# Patient Record
Sex: MALE | Race: BLACK OR AFRICAN AMERICAN | Employment: FULL TIME
[De-identification: names, ages, dates, MRNs, and addresses within clinical notes are randomized per-mention and may not be internally consistent; named-entity substitution may affect disease eponyms.]

---

## 2017-01-04 ENCOUNTER — TELEPHONE (OUTPATIENT)
Dept: ORTHOPEDIC SURGERY | Facility: CLINIC | Age: 47
End: 2017-01-04

## 2017-01-05 ENCOUNTER — OFFICE VISIT (OUTPATIENT)
Dept: INTERNAL MEDICINE | Facility: CLINIC | Age: 47
End: 2017-01-05

## 2017-01-05 VITALS
DIASTOLIC BLOOD PRESSURE: 74 MMHG | HEART RATE: 99 BPM | BODY MASS INDEX: 31.06 KG/M2 | WEIGHT: 242 LBS | HEIGHT: 74 IN | SYSTOLIC BLOOD PRESSURE: 122 MMHG

## 2017-01-05 DIAGNOSIS — E66.9 OBESITY (BMI 30.0-34.9): ICD-10-CM

## 2017-01-05 DIAGNOSIS — I10 ESSENTIAL HYPERTENSION: Primary | ICD-10-CM

## 2017-01-05 DIAGNOSIS — R73.03 PREDIABETES: ICD-10-CM

## 2017-01-05 DIAGNOSIS — M25.562 CHRONIC PAIN OF LEFT KNEE: ICD-10-CM

## 2017-01-05 DIAGNOSIS — G89.29 CHRONIC PAIN OF LEFT KNEE: ICD-10-CM

## 2017-01-05 DIAGNOSIS — E78.2 MIXED HYPERLIPIDEMIA: ICD-10-CM

## 2017-01-05 PROCEDURE — 99213 OFFICE O/P EST LOW 20 MIN: CPT | Performed by: INTERNAL MEDICINE

## 2017-01-05 RX ORDER — LISINOPRIL 10 MG/1
10 TABLET ORAL DAILY
Qty: 30 TABLET | Refills: 11 | Status: SHIPPED | OUTPATIENT
Start: 2017-01-05 | End: 2017-10-02 | Stop reason: SDUPTHER

## 2017-01-05 ASSESSMENT — ENCOUNTER SYMPTOMS
SHORTNESS OF BREATH: 0
NAUSEA: 0
BLURRED VISION: 0
CONSTIPATION: 0
COUGH: 0
BACK PAIN: 1
EYE REDNESS: 0
ABDOMINAL PAIN: 0

## 2017-01-16 ENCOUNTER — TELEPHONE (OUTPATIENT)
Dept: ORTHOPEDIC SURGERY | Facility: CLINIC | Age: 47
End: 2017-01-16

## 2017-02-21 DIAGNOSIS — I10 ESSENTIAL HYPERTENSION: ICD-10-CM

## 2017-02-21 RX ORDER — LISINOPRIL 5 MG/1
TABLET ORAL
Qty: 30 TABLET | Refills: 2 | Status: SHIPPED | OUTPATIENT
Start: 2017-02-21 | End: 2017-05-23 | Stop reason: SDUPTHER

## 2017-04-17 DIAGNOSIS — M25.562 LEFT KNEE PAIN, UNSPECIFIED CHRONICITY: Primary | ICD-10-CM

## 2017-04-19 ENCOUNTER — OFFICE VISIT (OUTPATIENT)
Dept: ORTHOPEDIC SURGERY | Age: 47
End: 2017-04-19
Payer: COMMERCIAL

## 2017-04-19 VITALS — HEIGHT: 74 IN | WEIGHT: 240 LBS | BODY MASS INDEX: 30.8 KG/M2

## 2017-04-19 DIAGNOSIS — M17.12 PRIMARY OSTEOARTHRITIS OF LEFT KNEE: Primary | ICD-10-CM

## 2017-04-19 PROCEDURE — 99213 OFFICE O/P EST LOW 20 MIN: CPT | Performed by: STUDENT IN AN ORGANIZED HEALTH CARE EDUCATION/TRAINING PROGRAM

## 2017-04-19 ASSESSMENT — ENCOUNTER SYMPTOMS
CHOKING: 0
APNEA: 0
DIARRHEA: 0
VOMITING: 0

## 2017-05-23 DIAGNOSIS — I10 ESSENTIAL HYPERTENSION: ICD-10-CM

## 2017-05-23 RX ORDER — LISINOPRIL 5 MG/1
TABLET ORAL
Qty: 30 TABLET | Refills: 2 | Status: SHIPPED | OUTPATIENT
Start: 2017-05-23 | End: 2017-08-25 | Stop reason: SDUPTHER

## 2017-07-23 DIAGNOSIS — G89.29 CHRONIC LOW BACK PAIN: ICD-10-CM

## 2017-07-23 DIAGNOSIS — E78.5 HYPERLIPIDEMIA: ICD-10-CM

## 2017-07-23 DIAGNOSIS — I10 HYPERTENSION: ICD-10-CM

## 2017-07-23 DIAGNOSIS — R73.03 PREDIABETES: ICD-10-CM

## 2017-07-23 DIAGNOSIS — M54.50 CHRONIC LOW BACK PAIN: ICD-10-CM

## 2017-07-24 RX ORDER — ACETAMINOPHEN/DIPHENHYDRAMINE 500MG-25MG
TABLET ORAL
Qty: 30 TABLET | Refills: 11 | Status: SHIPPED | OUTPATIENT
Start: 2017-07-24 | End: 2018-08-26 | Stop reason: SDUPTHER

## 2017-07-24 RX ORDER — ATORVASTATIN CALCIUM 20 MG/1
TABLET, FILM COATED ORAL
Qty: 30 TABLET | Refills: 11 | Status: SHIPPED | OUTPATIENT
Start: 2017-07-24 | End: 2018-04-09 | Stop reason: SDUPTHER

## 2017-07-24 RX ORDER — MELOXICAM 15 MG/1
TABLET ORAL
Qty: 30 TABLET | Refills: 11 | Status: SHIPPED | OUTPATIENT
Start: 2017-07-24 | End: 2017-10-02 | Stop reason: ALTCHOICE

## 2017-08-25 DIAGNOSIS — I10 ESSENTIAL HYPERTENSION: ICD-10-CM

## 2017-08-25 RX ORDER — LISINOPRIL 5 MG/1
TABLET ORAL
Qty: 30 TABLET | Refills: 2 | Status: SHIPPED | OUTPATIENT
Start: 2017-08-25 | End: 2017-12-02 | Stop reason: SDUPTHER

## 2017-09-22 ENCOUNTER — HOSPITAL ENCOUNTER (EMERGENCY)
Age: 47
Discharge: LEFT BEFORE DISCHARGE | End: 2017-09-22
Attending: EMERGENCY MEDICINE
Payer: COMMERCIAL

## 2017-09-22 ENCOUNTER — APPOINTMENT (OUTPATIENT)
Dept: GENERAL RADIOLOGY | Age: 47
End: 2017-09-22
Payer: COMMERCIAL

## 2017-09-22 VITALS
HEIGHT: 74 IN | OXYGEN SATURATION: 98 % | DIASTOLIC BLOOD PRESSURE: 70 MMHG | SYSTOLIC BLOOD PRESSURE: 134 MMHG | BODY MASS INDEX: 28.86 KG/M2 | TEMPERATURE: 98.6 F | WEIGHT: 224.87 LBS | HEART RATE: 62 BPM | RESPIRATION RATE: 12 BRPM

## 2017-09-22 DIAGNOSIS — M79.672 PAIN IN BOTH FEET: ICD-10-CM

## 2017-09-22 DIAGNOSIS — Z53.20 LEFT BEFORE TREATMENT COMPLETED: Primary | ICD-10-CM

## 2017-09-22 DIAGNOSIS — M79.671 PAIN IN BOTH FEET: ICD-10-CM

## 2017-09-22 LAB
CHP ED QC CHECK: NORMAL
GLUCOSE BLD-MCNC: 90 MG/DL
GLUCOSE BLD-MCNC: 90 MG/DL (ref 75–110)

## 2017-09-22 PROCEDURE — 82947 ASSAY GLUCOSE BLOOD QUANT: CPT

## 2017-09-22 PROCEDURE — 72100 X-RAY EXAM L-S SPINE 2/3 VWS: CPT

## 2017-09-22 PROCEDURE — 99283 EMERGENCY DEPT VISIT LOW MDM: CPT

## 2017-09-22 ASSESSMENT — ENCOUNTER SYMPTOMS
COLOR CHANGE: 0
BACK PAIN: 1

## 2017-09-22 ASSESSMENT — PAIN SCALES - GENERAL: PAINLEVEL_OUTOF10: 10

## 2017-09-22 ASSESSMENT — PAIN DESCRIPTION - FREQUENCY: FREQUENCY: CONTINUOUS

## 2017-09-22 ASSESSMENT — PAIN DESCRIPTION - DESCRIPTORS: DESCRIPTORS: BURNING;SHARP

## 2017-09-22 ASSESSMENT — PAIN DESCRIPTION - ORIENTATION: ORIENTATION: RIGHT;LEFT

## 2017-10-02 ENCOUNTER — OFFICE VISIT (OUTPATIENT)
Dept: INTERNAL MEDICINE | Age: 47
End: 2017-10-02
Payer: COMMERCIAL

## 2017-10-02 VITALS
BODY MASS INDEX: 28.36 KG/M2 | WEIGHT: 221 LBS | DIASTOLIC BLOOD PRESSURE: 87 MMHG | SYSTOLIC BLOOD PRESSURE: 135 MMHG | HEIGHT: 74 IN | HEART RATE: 53 BPM

## 2017-10-02 DIAGNOSIS — G89.29 CHRONIC BILATERAL LOW BACK PAIN WITHOUT SCIATICA: ICD-10-CM

## 2017-10-02 DIAGNOSIS — L84 CALLUS OF FOOT: ICD-10-CM

## 2017-10-02 DIAGNOSIS — R73.02 IGT (IMPAIRED GLUCOSE TOLERANCE): ICD-10-CM

## 2017-10-02 DIAGNOSIS — I10 ESSENTIAL HYPERTENSION: Primary | ICD-10-CM

## 2017-10-02 DIAGNOSIS — M17.12 PRIMARY OSTEOARTHRITIS OF LEFT KNEE: ICD-10-CM

## 2017-10-02 DIAGNOSIS — F17.200 SMOKER: ICD-10-CM

## 2017-10-02 DIAGNOSIS — M54.50 CHRONIC BILATERAL LOW BACK PAIN WITHOUT SCIATICA: ICD-10-CM

## 2017-10-02 DIAGNOSIS — E78.2 MIXED HYPERLIPIDEMIA: ICD-10-CM

## 2017-10-02 LAB — HBA1C MFR BLD: 5.5 %

## 2017-10-02 PROCEDURE — 83036 HEMOGLOBIN GLYCOSYLATED A1C: CPT | Performed by: INTERNAL MEDICINE

## 2017-10-02 PROCEDURE — 99213 OFFICE O/P EST LOW 20 MIN: CPT | Performed by: INTERNAL MEDICINE

## 2017-10-02 ASSESSMENT — PATIENT HEALTH QUESTIONNAIRE - PHQ9
8. MOVING OR SPEAKING SO SLOWLY THAT OTHER PEOPLE COULD HAVE NOTICED. OR THE OPPOSITE, BEING SO FIGETY OR RESTLESS THAT YOU HAVE BEEN MOVING AROUND A LOT MORE THAN USUAL: 0
4. FEELING TIRED OR HAVING LITTLE ENERGY: 0
10. IF YOU CHECKED OFF ANY PROBLEMS, HOW DIFFICULT HAVE THESE PROBLEMS MADE IT FOR YOU TO DO YOUR WORK, TAKE CARE OF THINGS AT HOME, OR GET ALONG WITH OTHER PEOPLE: 1
2. FEELING DOWN, DEPRESSED OR HOPELESS: 2
3. TROUBLE FALLING OR STAYING ASLEEP: 0
7. TROUBLE CONCENTRATING ON THINGS, SUCH AS READING THE NEWSPAPER OR WATCHING TELEVISION: 1
5. POOR APPETITE OR OVEREATING: 1
1. LITTLE INTEREST OR PLEASURE IN DOING THINGS: 0
6. FEELING BAD ABOUT YOURSELF - OR THAT YOU ARE A FAILURE OR HAVE LET YOURSELF OR YOUR FAMILY DOWN: 2
9. THOUGHTS THAT YOU WOULD BE BETTER OFF DEAD, OR OF HURTING YOURSELF: 1
SUM OF ALL RESPONSES TO PHQ9 QUESTIONS 1 & 2: 2
SUM OF ALL RESPONSES TO PHQ QUESTIONS 1-9: 7

## 2017-10-02 ASSESSMENT — ENCOUNTER SYMPTOMS: BACK PAIN: 1

## 2017-10-02 NOTE — PROGRESS NOTES
clinically.        Minor degenerative changes centered at L4-L5 disc space level without    central canal narrowing with evidence of neural foraminal narrowing         Knee left  Impression   : Moderate medial compartment osteoarthritis that is passively correctable on stress view.       I have reviewed the above x-rays and agree with this resident-generated radiology report.             Past Medical History:   Diagnosis Date    Asthma     Chronic back pain     Headache     HLD (hyperlipidemia) 11/6/2013    Hypertension 7/10/2014    Obesity 10/10/2013    Substance abuse     Type II or unspecified type diabetes mellitus without mention of complication, not stated as uncontrolled        No past surgical history on file. ALLERGIES    No Known Allergies    MEDICATIONS:      Current Outpatient Prescriptions on File Prior to Visit   Medication Sig Dispense Refill    lisinopril (PRINIVIL;ZESTRIL) 5 MG tablet take 1 tablet by mouth once daily 30 tablet 2    RA ASPIRIN EC 81 MG EC tablet take 1 tablet by mouth once daily 30 tablet 11    atorvastatin (LIPITOR) 20 MG tablet take 1 tablet by mouth once daily 30 tablet 11    ibuprofen (ADVIL;MOTRIN) 800 MG tablet Take 1 tablet by mouth every 8 hours as needed for Pain 120 tablet 2    VENTOLIN  (90 BASE) MCG/ACT inhaler INHALE 2 PUFFS EVERY SIX HOURS AS NEEDED FOR WHEEZING 18 g 11     No current facility-administered medications on file prior to visit. SOCIAL HISTORY    Reviewed and no change from previous record. Sekou Song  reports that he has been smoking Cigars. He has smoked for the past 10.00 years.  He has never used smokeless tobacco.    FAMILY HISTORY:    Reviewed and No change from previous visit    HEALTH MAINTENANCE DUE:      Health Maintenance Due   Topic Date Due    Flu vaccine (1) 09/01/2017       REVIEW OF SYSTEMS:    12 point review of symptoms completed and found to be normal except noted in the HPI    Review of Systems Constitutional: Negative for fever, malaise/fatigue and weight loss. HENT: Negative for congestion, hearing loss and sore throat. Eyes: Negative for blurred vision and redness. Respiratory: Negative for cough, sputum production and shortness of breath. Cardiovascular: Negative for chest pain, palpitations and leg swelling. Gastrointestinal: Negative for abdominal pain, blood in stool, constipation and nausea. Musculoskeletal: Positive for back pain and joint pain. Negative for falls and myalgias. Skin: Negative for itching and rash. Neurological: Negative for dizziness, sensory change, focal weakness and loss of consciousness. Endo/Heme/Allergies: Negative for polydipsia. Does not bruise/bleed easily. Psychiatric/Behavioral: Positive for depression. Negative for hallucinations, substance abuse and suicidal ideas. The patient is not nervous/anxious. PHYSICAL EXAM:      Vitals:    10/02/17 0909   BP: (!) 138/90   Site: Left Arm   Position: Sitting   Cuff Size: Large Adult   Pulse: 58   Weight: 221 lb (100.2 kg)   Height: 6' 2\" (1.88 m)     Body mass index is 28.37 kg/(m^2). BP Readings from Last 3 Encounters:   10/02/17 (!) 138/90   09/22/17 134/70   01/05/17 122/74        Wt Readings from Last 3 Encounters:   10/02/17 221 lb (100.2 kg)   09/22/17 224 lb 13.9 oz (102 kg)   04/19/17 240 lb (108.9 kg)       Physical Exam      HENT:  Normocephalic, Atraumatic, Bilateral external ears normal, Oropharynx moist, No oral exudates, Nose normal. Neck- Normal range of motion, No tenderness, Supple, No stridor. Eyes:  PERRL, EOMI, Conjunctiva normal, No discharge. Respiratory:  Normal breath sounds, No respiratory distress, No wheezing, No chest tenderness. Cardiovascular:  Normal heart rate, Normal rhythm, No murmurs, No rubs, No gallops. GI:  Bowel sounds normal, Soft, No tenderness  Musculoskeletal:  Intact distal pulses, No edema, No tenderness, Back- No tenderness. Decreased ROM. B/l foot calluses. Integument:  Warm, Dry, No erythema, No rash. Lymphatic:  No lymphadenopathy noted. Neurologic:  Alert & oriented x 3, Normal motor function, Normal sensory function, No focal deficits noted. Psychiatric:  Affect normal    LABORATORY FINDINGS:    CBC:  Lab Results   Component Value Date    WBC 4.4 07/02/2015    HGB 14.5 07/02/2015     07/02/2015     BMP:    Lab Results   Component Value Date     01/05/2017    K 3.8 01/05/2017     01/05/2017    CO2 27 01/05/2017    BUN 13 01/05/2017    CREATININE 1.04 01/05/2017    GLUCOSE 90 09/22/2017     HEMOGLOBIN A1C:   Lab Results   Component Value Date    LABA1C 5.5 10/02/2017     MICROALBUMIN URINE:   Lab Results   Component Value Date    MICROALBUR <12 04/08/2016     FASTING LIPID PANEL:  Lab Results   Component Value Date    CHOL 111 01/05/2017    HDL 31 (L) 01/05/2017    TRIG 70 01/05/2017     Lab Results   Component Value Date    LDLCHOLESTEROL 66 01/05/2017       LIVER PROFILE:  Lab Results   Component Value Date    ALT 40 07/02/2015    AST 20 07/02/2015    PROT 7.0 07/02/2015    BILITOT 0.74 07/02/2015    BILIDIR <0.08 07/02/2015    LABALBU 4.5 07/02/2015      THYROID FUNCTION:   Lab Results   Component Value Date    TSH 1.16 07/02/2015      URINE ANALYSIS: No results found for: LABURIN  ASSESSMENT AND PLAN:    1. Essential hypertension  Continue Lisinopril      2. Callus of foot    - [address]    3. Mixed hyperlipidemia  statin    4. Primary osteoarthritis of left knee  Follow up with orthopedics    - [facility]    5. Chronic bilateral low back pain without sciatica  Back stretches    - [facility]    6. IGT (impaired glucose tolerance)    - POCT glycosylated hemoglobin (Hb A1C)    7. Smoker            FOLLOW UP AND INSTRUCTIONS:   Return in about 6 months (around 4/2/2018).     1Judy Enio Alarcon received counseling on the following healthy behaviors: nutrition, exercise and medication adherence    2. Reviewed prior labs and health maintenance. 3. Discussed use, benefit, and side effects of prescribed medications. Barriers to medication compliance addressed. All patient questions answered. Pt voiced understanding.       Vinay Magana  Attending Physician, [address] [place] [address], [facility] Medicine [facility] Program  [address] [place] [address]  10/2/2017, 10:00 AM

## 2017-10-02 NOTE — MR AVS SNAPSHOT
After Visit Summary             Mary Cartwright   10/2/2017 9:30 AM   Office Visit    Description:  Male : 1970   Provider:  Mitch Salas MD   Department:  [facility] and Future Appointments         Below is a list of your follow-up and future appointments. This may not be a complete list as you may have made appointments directly with providers that we are not aware of or your providers may have made some for you. Please call your providers to confirm appointments. It is important to keep your appointments. Please bring your current insurance card, photo ID, co-pay, and all medication bottles to your appointment. If self-pay, payment is expected at the time of service. Your To-Do List     Future Appointments Provider Department Dept Phone    2017 12:45 PM Jacki Peter, [address] [place] [phone]    Patient must bring photo ID, insurance card, co-pay, and medication bottles to appointment Appointment must be kept or cancelled within 24 hours Patient should be prepared to provide PCP name and date of last PCP visit during their appointment    2018 9:00 AM ZAYNAB Leo [address] [phone]    Please arrive 15 minutes prior to appointment time, bring insurance card and photo ID. Follow-Up    Return in about 6 months (around 2018).          Information from Your Visit        Department     Name Address Phone Fax    Z[facility]NA[facility] [address] [phone]      You Were Seen for:         Comments    Essential hypertension   [[id number]]         Vital Signs     Blood Pressure Pulse Height Weight Body Mass Index Smoking Status    135/87 (Site: Left Arm, Position: Sitting, Cuff Size: Large Adult) 53 6' 2\" (1.88 m) 221 lb (100.2 kg) 28.37 kg/m2 Current Every Day Smoker Additional Information about your Body Mass Index (BMI)           Your BMI as listed above is considered overweight (25.0-29.9). BMI is an estimate of body fat, calculated from your height and weight. The higher your BMI, the greater your risk of heart disease, high blood pressure, type 2 diabetes, stroke, gallstones, arthritis, sleep apnea, and certain cancers. BMI is not perfect. It may overestimate body fat in athletes and people who are more muscular. If your body fat is high you can improve your BMI by decreasing your calorie intake and becoming more physically active. Learn more at: [employer]          Instructions    -Follow-up appointment scheduled for 4/9/18, AVS given to patient. It is very important that you keep your appointments, please contact us if you are unable to keep your scheduled appt     [facility] Your appt is 11/8/17 at 12:45 pm. Please arrive 15 mins early and bring ID and insurance information. Address and number given to pt. If you can't make your appt please call [phone]. If you no show to this appt you will have to find another office due to this office strict no show policy. Referral to [address] [place] [address] was placed, information given to patient, they will contact the facility to set up an appt. ---Krista                    Today's Medication Changes          These changes are accurate as of: 10/2/17 10:16 AM.  If you have any questions, ask your nurse or doctor.                STOP taking these medications           meloxicam 15 MG tablet   Commonly known as:  MOBIC   Stopped by:  Hunter Andino MD               Your Current Medications Are              lisinopril (PRINIVIL;ZESTRIL) 5 MG tablet take 1 tablet by mouth once daily    RA ASPIRIN EC 81 MG EC tablet take 1 tablet by mouth once daily    atorvastatin (LIPITOR) 20 MG tablet take 1 tablet by mouth once daily ibuprofen (ADVIL;MOTRIN) 800 MG tablet Take 1 tablet by mouth every 8 hours as needed for Pain    VENTOLIN  (90 BASE) MCG/ACT inhaler INHALE 2 PUFFS EVERY SIX HOURS AS NEEDED FOR WHEEZING      Allergies           No Known Allergies      We Ordered/Performed the following           [facility] [address]     Scheduling Instructions:    [facility]  [address]  [address]  [phone]    Comments: The patient can be scheduled with any member of the group, including the provider with the first available appointments. [facility]     Scheduling Instructions:    [facility]  [address] [place], [address]  [phone]    Comments: The patient can be scheduled with any member of the group, including the provider with the first available appointments.      POCT glycosylated hemoglobin (Hb A1C)          Result Summary for POCT glycosylated hemoglobin (Hb A1C)      Result Information       Status          Normal Final result (Collected: 10/2/2017  9:40 AM)           10/2/2017  9:40 AM      Component Results     Component Value Ref Range & Units Status    Hemoglobin A1C 5.5 % Final               Additional Information        Basic Information     Date Of Birth Sex Race Ethnicity Preferred Language    1970 Male Black Non-/Non  English      Problem List as of 10/2/2017  Date Reviewed: 10/4/2016                Left knee pain    Headache    Lumbago    Hyperlipidemia    Hypertension    Asthma    Back pain    Obesity      Immunizations as of 10/2/2017     Name Date    Tdap (Boostrix, Adacel) 10/4/2016      Preventive Care        Date Due    Yearly Flu Vaccine (1) 9/1/2017    Pneumococcal Vaccine - Pneumovax for adults aged 19-64 years with: chronic heart disease, chronic lung disease, diabetes mellitus, alcoholism, chronic liver disease, or cigarette smoking. (1 of 1 - PPSV23)

## 2017-10-08 ASSESSMENT — ENCOUNTER SYMPTOMS
SORE THROAT: 0
NAUSEA: 0
COUGH: 0
CONSTIPATION: 0
SPUTUM PRODUCTION: 0
SHORTNESS OF BREATH: 0
BLURRED VISION: 0
BLOOD IN STOOL: 0
ABDOMINAL PAIN: 0
EYE REDNESS: 0

## 2017-10-11 ENCOUNTER — HOSPITAL ENCOUNTER (OUTPATIENT)
Dept: PAIN MANAGEMENT | Age: 47
Discharge: HOME | End: 2017-10-11
Payer: COMMERCIAL

## 2017-10-11 VITALS
DIASTOLIC BLOOD PRESSURE: 70 MMHG | SYSTOLIC BLOOD PRESSURE: 140 MMHG | WEIGHT: 221 LBS | TEMPERATURE: 98.6 F | OXYGEN SATURATION: 99 % | HEIGHT: 74 IN | BODY MASS INDEX: 28.36 KG/M2 | HEART RATE: 53 BPM | RESPIRATION RATE: 18 BRPM

## 2017-10-11 DIAGNOSIS — M54.50 CHRONIC BILATERAL LOW BACK PAIN WITHOUT SCIATICA: Primary | ICD-10-CM

## 2017-10-11 DIAGNOSIS — G89.29 CHRONIC BILATERAL LOW BACK PAIN WITHOUT SCIATICA: Primary | ICD-10-CM

## 2017-10-11 PROCEDURE — 80307 DRUG TEST PRSMV CHEM ANLYZR: CPT

## 2017-10-11 PROCEDURE — 99244 OFF/OP CNSLTJ NEW/EST MOD 40: CPT | Performed by: PAIN MEDICINE

## 2017-10-11 PROCEDURE — 99204 OFFICE O/P NEW MOD 45 MIN: CPT

## 2017-10-11 RX ORDER — MELOXICAM 7.5 MG/1
7.5 TABLET ORAL DAILY
COMMUNITY
End: 2017-11-08

## 2017-10-11 ASSESSMENT — ACTIVITIES OF DAILY LIVING (ADL): EFFECT OF PAIN ON DAILY ACTIVITIES: UNABLE TO WALK SHORT DISTANCES WITHOUT HAVING PAIN

## 2017-10-11 ASSESSMENT — ENCOUNTER SYMPTOMS
PHOTOPHOBIA: 0
CONSTIPATION: 0
BLURRED VISION: 0
BACK PAIN: 1
HEARTBURN: 0
VOMITING: 0
NAUSEA: 0

## 2017-10-11 ASSESSMENT — PAIN DESCRIPTION - PROGRESSION: CLINICAL_PROGRESSION: GRADUALLY WORSENING

## 2017-10-11 ASSESSMENT — PAIN SCALES - GENERAL: PAINLEVEL_OUTOF10: 10

## 2017-10-11 ASSESSMENT — PAIN DESCRIPTION - LOCATION: LOCATION: BACK

## 2017-10-11 ASSESSMENT — PAIN DESCRIPTION - ONSET: ONSET: ON-GOING

## 2017-10-11 ASSESSMENT — PAIN DESCRIPTION - ORIENTATION: ORIENTATION: RIGHT;LEFT

## 2017-10-11 ASSESSMENT — PAIN DESCRIPTION - FREQUENCY: FREQUENCY: CONTINUOUS

## 2017-10-11 ASSESSMENT — PAIN DESCRIPTION - PAIN TYPE: TYPE: CHRONIC PAIN

## 2017-10-11 ASSESSMENT — PAIN DESCRIPTION - DESCRIPTORS: DESCRIPTORS: BURNING;SHARP;STABBING

## 2017-10-11 ASSESSMENT — PAIN DESCRIPTION - DIRECTION: RADIATING_TOWARDS: BILATERAL LEGS

## 2017-10-11 NOTE — PROGRESS NOTES
[facility] Pain Management  Patient Pain Assessment  Consultation - No att. providers found    Primary Care Physician: Mercedes Mar MD    Chief complaint: Low back pain. HISTORY OF PRESENT ILLNESS:  Gunjan Aviles is 56 y.o. male referred to the pain clinic in consultation for low back pain by Josefa Schwarz MD    Patient is a 46-year-old male with history of low back pain of about 2 years duration. Patient reports he fell off for 20 feet from [facility] and bruised his back about 2 years ago. At that time apparently he went to [facility] [place] ER as well as [address] [place] [address] Apparently had an MRI and x-rays done and was sent home. He developed some bruise and was seen by a physician and apparently there are some medications. Patient was also told he has some arthritis in his back. He was sent to physical therapy which helped some. Patient cannot relate any factors that helped his back pain goal is to \"get rid of the pain\" because he likes to work and he cannot work at this time. Patient admits to using [facility] on a routine basis      Back Pain   This is a chronic problem. The current episode started more than 1 year ago. The problem occurs constantly. The problem has been gradually worsening since onset. The pain is present in the lumbar spine and gluteal. The quality of the pain is described as burning (sharp). The pain radiates to the left knee and right knee. The pain is at a severity of 10/10 (8-10). The pain is severe. The pain is the same all the time. The symptoms are aggravated by bending and lying down (lifting). Stiffness is present all day. Associated symptoms include headaches and weakness. Pertinent negatives include no chest pain, dysuria, fever or weight loss. (Both legs) Risk factors: smoking.        OARRS compliant? not applicable  Concern for prescription abuse?not applicable    Current Pain Assessment  Pain Assessment  Pain Assessment: 0-10  Pain Level: 10  Pain Type: Chronic pain  Pain Location: Back  Pain Orientation: Right, Left  Pain Radiating Towards: bilateral legs  Pain Descriptors: Burning, Sharp, Stabbing  Pain Frequency: Continuous  Pain Onset: On-going  Clinical Progression: Gradually worsening  Effect of Pain on Daily Activities: unable to walk short distances without having pain  Patient's Stated Pain Goal: 2 (decrease pain an increase activity)  Pain Intervention(s):  (smoking marijuana)                    ADVERSE MEDICATION EFFECTS:   Constipation: no  Bowel Regimen: No  Diet: common adult  Appetite:  ok  Sedation:  no  Urinary Retention: no    FOCUSED PAIN SCALE:  Highest : 10  Lowest :7  Average: Range-varies with activity  When and What  was your last procedure: none     Was your procedure effective:  na    ACTIVITY/SOCIAL/EMOTIONAL:  Sleep Pattern: 5 hours per night. generally restful sleep  Energy Level:  Tired/Fatigued  Currently attending Physical Therapy:  Last year   Home Exercises: never none  Mobility: states can only walk short distances  Currently seeing a Psychiatrist or Psychologist:  No, not sure when last saw.  Was  Only seen to be evaluated for disability  Emotional Issues: anxiety/ nervousness   Mood: depressed, denies any suicidal ideations    ABERRANT BEHAVIORS SINCE LAST VISIT:  Have you ever been treated in another Pain Clinic yes, [facility] in 2014  Refills for prescriptions appropriate: no  Lost rx/pills: not applicable  Taking more medication than prescribed:  not applicable  Are you receiving PAIN medications from  other doctors: not applicable  Last Urine/Serum Drug Screen :  Will collect today  Was Serum/UDS as anticipated?  not applicable  Brought pill bottles in :not applicable   Was Pill count appropriate? :not applicable   Are currently pregnant? not applicable  Recent ER visits: Yes, in September for foot pain             Past Medical History      Diagnosis Date    Asthma     Chronic back pain     Headache     HLD (hyperlipidemia) 11/6/2013 photophobia. Respiratory:        Asthma, Bronchitis   Cardiovascular: Negative for chest pain and palpitations. Gastrointestinal: Negative for constipation, heartburn, nausea and vomiting. Genitourinary: Negative for dysuria and hematuria. Musculoskeletal: Positive for back pain. Aliya Jordan off a balcony, approx 20 feet. Was evaluated in ED and discharged   Skin: Negative. Neurological: Positive for weakness and headaches. Endo/Heme/Allergies: Negative. Psychiatric/Behavioral: Positive for depression and substance abuse. Negative for suicidal ideas. The patient is nervous/anxious. GENERAL PHYSICAL EXAM:  Vitals: BP (!) 140/70   Pulse 53   Temp 98.6 °F (37 °C) (Oral)   Resp 18   Ht 6' 2\" (1.88 m)   Wt 221 lb (100.2 kg)   SpO2 99%   BMI 28.37 kg/m² , Body mass index is 28.37 kg/m². Physical Exam   Constitutional: He is oriented to person, place, and time. He appears well-developed and well-nourished. HENT:   Head: Normocephalic and atraumatic. Eyes: Conjunctivae and EOM are normal. Pupils are equal, round, and reactive to light. Neck: Normal range of motion. Neck supple. No tracheal deviation present. No thyromegaly present. Cardiovascular: Normal rate and regular rhythm. Pulmonary/Chest: Effort normal and breath sounds normal.   Abdominal: Soft. Bowel sounds are normal. He exhibits no distension. There is no tenderness. Musculoskeletal: He exhibits no edema. Neurological: He is alert and oriented to person, place, and time. He has normal reflexes. No cranial nerve deficit or sensory deficit. He exhibits normal muscle tone. Coordination and gait normal.   Reflex Scores:       Patellar reflexes are 2+ on the right side and 2+ on the left side. Achilles reflexes are 2+ on the right side and 2+ on the left side. Skin: Skin is warm and dry. Psychiatric: He has a normal mood and affect.  His speech is normal and behavior is normal. Judgment and thought content normal. Cognition and memory are normal.    Right Ankle Exam     Muscle Strength   The patient has normal right ankle strength. Left Ankle Exam     Muscle Strength   The patient has normal left ankle strength. Right Knee Exam     Muscle Strength     The patient has normal right knee strength. Left Knee Exam     Muscle Strength     The patient has normal left knee strength. Right Hip Exam     Muscle Strength   The patient has normal right hip strength. Left Hip Exam     Muscle Strength   The patient has normal left hip strength. Back Exam     Tenderness   The patient is experiencing tenderness in the lumbar and sacroiliac. Range of Motion   Back extension: Limited and painful. Back flexion: Limited and painful. Back lateral bend right: Limited and painful. Back lateral bend left: Limited and painful. Back rotation right: Limited and painful. Back rotation left: Limited and painful.      Tests   Straight leg raise right: negative  Straight leg raise left: negative    Other   Sensation: normal  Gait: normal   Erythema: no back redness  Scars: absent    Comments:  Skin --normal appearance  Surgical Scar --Absent   kyphosis absent and scoliosis absent  Alignment of her shoulders, scapulae and iliac crests--symmetric  Paraspinal tenderness:Present  Lumbar lordosis-----------Normal  Movements of the lumbar spine indicated above are diminished range with pain   Facet loading---  : Right side--    Pain-None                                   Left side----   Pain  None  Darvin's test -------------- Right side---negative                                           Left side-----negative            DATA  Labs:  10/2/2017  9:40 AM - Juliana Smiley MA     Component Results     Component Value Ref Range & Units Status Collected Lab   Hemoglobin A1C 5.5  % Final 10/02/2017  9:40 AM Unknown   Lab and Collection         Imaging:  Radiology Images and Reports reviewed where indicated and necessary  The vertebral body heights and disc spaces are preserved. L1-L2: No bulging disc, central canal narrowing or neural foraminal narrowing. This amount of fluid is seen in the left facet joint. L2-L3: No bulging disc, centered narrowing or neural foramina narrowing. L3-L4: No bulging disc, neural foramina narrowing or central canal narrowing   L4-L5: There is minimum broad-based bulging disc causing mild flattening of the ventral thecal sac. Facet arthropathy and ligamentum flavum hypertrophy is detected. There is mild left neural foramina narrowing and mild to moderate right neural foraminal narrowing. L5-S1: No bulging disc, central canal narrowing or neural foramina narrowing. Mild facet arthropathy is detected. There are no enlarged retroperitoneal lymph nodes. IVC and aorta are normal in caliber. IMPRESSION:   Nonspecific bone marrow signal alteration. The finding may related to smoking, anemia, osteopenia or diffuse pulmonary disease including neoplastic processes such as leukemia. Please correlate clinically. Minor degenerative changes centered at L4-L5 disc space level without central canal narrowing with evidence of neural foraminal narrowing   Report Electronically signed by Sharon De Guzman M.D. on 8/13/2014     FINDINGS:   Vertebral body heights and are maintained. Slight straightening of normal   lumbar lordosis could be positional or due to spasm. Pedicles are intact. No convincing evidence of acute fracture. Minor degenerative spurring in the   lumbar spine and visualized lower thoracic spine. Faint vascular   calcifications anteriorly. Sacroiliac joints appear symmetric. Impression   Minor degenerative changes. No convincing evidence of acute fracture. ASSESSMENT    Vazquez Mello is a 56 y.o. male with     1.  Chronic bilateral low back pain without sciatica          Patient Active Problem List   Diagnosis    Asthma    Back pain    that people with short-term low-back pain who rest feel more pain and have a harder time with daily tasks than those who stay active. 2. Keep Exercising-patient is advised to stay away from strenuous activities like gardening and avoid whatever motion caused the pain in the first place.   3. Maintain Good Posture-Exercises  to maintain good posture were shown to patient. 4. To do exercises learned in PT regularly. []Information (handout) on exercise was  given to patient. [x]  Patient is counseled about  ill effects of smoking for 7 minutes -Patient is strongly urged to quit smoking   Following health benefits were discussed:  People who stop smoking greatly reduce their risk for disease and premature death. Lowered risk for lung cancer and many other types of cancer. Reduced risk for coronary heart disease, stroke, and peripheral vascular disease. Reduced coronary heart disease risk within 1 to 2 years of quitting. Reduced respiratory symptoms, such as coughing, wheezing, and shortness of breath. The rate of decline in lung function is slower among people who quit smoking than among those who continue to smoke. Reduced risk of developing chronic obstructive pulmonary disease (COPD), one of the leading causes of death in the [place]. Reduced risk for infertility in women of reproductive age. Women who stop smoking during pregnancy also reduce their risk of having a low birth weight baby. Reduced risk of bone and joint damage. Methods to Quit Smoking  The majority of cigarette smokers quit without using evidence-based   Counseling and medication are both effective for treating tobacco dependence, and using them together is more effective than using either one alone. Patient is advised to follow up with his physician for further management.     Orders Placed This Encounter   Procedures    DRUG SCREEN, PAIN     Standing Status:   Standing     Number of Occurrences:   1     Patient's physical examination is essentially normal. His MRI and x-rays are essentially normal. At this time there is not much further to offer this patient and I did discuss with the patient importance of quitting smoking and also exercising. Importance of exercise and core strengthening was extensively discussed with the patient. As as at this time there is not much further I can do for this patient he is discharged to the care of his physician. Decision Making Process : Patient's health history and referral records thoroughly reviewed before focused physical examination and discussion with patient. Over 50% of today's visit is spent on examining the patient and counseling. Level of complexity of date to be reviewed is Moderate. The chart date reviewed include the following: Imaging Reports. Summary of Care. Time spent reviewing with patient the below reports:   Medication safety, Treatment options. Level of diagnosis and management options of this case is multiple: involving the following management options: Interventions as needed, medication management as appropriate, future visits, activity modification, heat/ice as needed, Urine drug screen as required. [x]The patient's questions were answered to the best of my abilities. This note was created using voice recognition software. There may be inaccuracies of transcription  that are inadvertently overlooked prior to the signature. There is any questions about the transcription please contact me.     Electronically signed by Kehinde Ford MD on 10/11/2017 at 10:40 AM

## 2017-10-15 LAB
6-ACETYLMORPHINE, UR: NOT DETECTED
7-AMINOCLONAZEPAM, URINE: NOT DETECTED
ALPHA-OH-ALPRAZ, URINE: NOT DETECTED
ALPRAZOLAM, URINE: NOT DETECTED
AMPHETAMINES, URINE: NOT DETECTED
BARBITURATES, URINE: NOT DETECTED
BENZOYLECGONINE, UR: NOT DETECTED
BUPRENORPHINE URINE: NOT DETECTED
CARISOPRODOL, UR: NOT DETECTED
CLONAZEPAM, URINE: NOT DETECTED
CODEINE, URINE: NOT DETECTED
CREATININE URINE: 153.6 MG/DL (ref 20–400)
DIAZEPAM, URINE: NOT DETECTED
DRUGS EXPECTED, UR: NORMAL
EER HI RES INTERP UR: NORMAL
ETHYL GLUCURONIDE UR: NOT DETECTED
FENTANYL URINE: NOT DETECTED
HYDROCODONE, URINE: NOT DETECTED
HYDROMORPHONE, URINE: NOT DETECTED
LORAZEPAM, URINE: NOT DETECTED
MARIJUANA METAB, UR: PRESENT
MDA, UR: NOT DETECTED
MDEA, EVE, UR: NOT DETECTED
MDMA URINE: NOT DETECTED
MEPERIDINE METAB, UR: NOT DETECTED
METHADONE, URINE: NOT DETECTED
METHAMPHETAMINE, URINE: NOT DETECTED
METHYLPHENIDATE: NOT DETECTED
MIDAZOLAM, URINE: NOT DETECTED
MORPHINE URINE: NOT DETECTED
NORBUPRENORPHINE, URINE: NOT DETECTED
NORDIAZEPAM, URINE: NOT DETECTED
NORFENTANYL, URINE: NOT DETECTED
NORHYDROCODONE, URINE: NOT DETECTED
NOROXYCODONE, URINE: NOT DETECTED
NOROXYMORPHONE, URINE: NOT DETECTED
OXAZEPAM, URINE: NOT DETECTED
OXYCODONE URINE: NOT DETECTED
OXYMORPHONE, URINE: NOT DETECTED
PAIN MANAGEMENT DRUG PANEL INTERP, URINE: NORMAL
PAIN MGT DRUG PANEL, HI RES, UR: NORMAL
PCP,URINE: NOT DETECTED
PHENTERMINE, UR: NOT DETECTED
PROPOXYPHENE, URINE: NOT DETECTED
TAPENTADOL, URINE: NOT DETECTED
TAPENTADOL-O-SULFATE, URINE: NOT DETECTED
TEMAZEPAM, URINE: NOT DETECTED
TRAMADOL, URINE: NOT DETECTED
ZOLPIDEM, URINE: NOT DETECTED

## 2017-11-08 ENCOUNTER — OFFICE VISIT (OUTPATIENT)
Dept: PODIATRY | Age: 47
End: 2017-11-08
Payer: COMMERCIAL

## 2017-11-08 VITALS
SYSTOLIC BLOOD PRESSURE: 125 MMHG | WEIGHT: 226 LBS | BODY MASS INDEX: 29 KG/M2 | HEIGHT: 74 IN | HEART RATE: 65 BPM | DIASTOLIC BLOOD PRESSURE: 83 MMHG

## 2017-11-08 DIAGNOSIS — L84 CALLUS: Primary | ICD-10-CM

## 2017-11-08 DIAGNOSIS — M79.671 PAIN IN BOTH FEET: ICD-10-CM

## 2017-11-08 DIAGNOSIS — G60.9 IDIOPATHIC NEUROPATHY: ICD-10-CM

## 2017-11-08 DIAGNOSIS — M20.5X2 ACQUIRED DIGITI QUINTI VARUS DEFORMITY OF LEFT FOOT: ICD-10-CM

## 2017-11-08 DIAGNOSIS — M79.672 PAIN IN BOTH FEET: ICD-10-CM

## 2017-11-08 DIAGNOSIS — M20.5X1 ACQUIRED DIGITI QUINTI VARUS DEFORMITY OF RIGHT FOOT: ICD-10-CM

## 2017-11-08 PROCEDURE — 99203 OFFICE O/P NEW LOW 30 MIN: CPT | Performed by: PODIATRIST

## 2017-11-08 RX ORDER — IBUPROFEN 600 MG/1
600 TABLET ORAL EVERY 6 HOURS PRN
Qty: 120 TABLET | Refills: 3 | Status: CANCELLED | OUTPATIENT
Start: 2017-11-08

## 2017-11-08 RX ORDER — MELOXICAM 15 MG/1
TABLET ORAL
Refills: 1 | COMMUNITY
Start: 2017-10-25 | End: 2018-04-09 | Stop reason: ALTCHOICE

## 2017-11-08 NOTE — PROGRESS NOTES
Podiatry Clinic Note    Subjective:   Prashanth Monsivais is a 56 y.o. male who presents to the office today complaining of painful calluses and burning toes bilateral. Patient states that the toes are equally burning bilateral. Patient denies being diabetic. Patient states that he has back pain with sciatica left >right. Patient denies being an alcoholic. Patient states that he has been having pain from his calluses. Patient denies using moisturizing lotion on his feet. Patient denies previously seeing a podiatrist. Currently denies F/C/N/V. Past Medical History:   Diagnosis Date    Asthma     Chronic back pain     Headache(784.0)     HLD (hyperlipidemia) 11/6/2013    Hypertension 7/10/2014    Obesity 10/10/2013    Substance abuse     Type II or unspecified type diabetes mellitus without mention of complication, not stated as uncontrolled        Prior to Admission medications    Medication Sig Start Date End Date Taking? Authorizing Provider   lisinopril (PRINIVIL;ZESTRIL) 5 MG tablet take 1 tablet by mouth once daily 8/25/17  Yes Oumar Wilson MD   RA ASPIRIN EC 81 MG EC tablet take 1 tablet by mouth once daily 7/24/17  Yes Olive Gil MD   atorvastatin (LIPITOR) 20 MG tablet take 1 tablet by mouth once daily 7/24/17  Yes Olive Gil MD   VENTOLIN  (90 BASE) MCG/ACT inhaler INHALE 2 PUFFS EVERY SIX HOURS AS NEEDED FOR WHEEZING 7/7/14  Yes Jericho Gregorio MD   meloxicam (MOBIC) 15 MG tablet  10/25/17   Historical Provider, MD       No past surgical history on file.     Family History   Problem Relation Age of Onset   Rudi Orchard Cancer Mother     Other Mother      sickle cell anemia       Social History   Substance Use Topics    Smoking status: Current Every Day Smoker     Packs/day: 1.00     Years: 10.00     Types: Cigars    Smokeless tobacco: Never Used    Alcohol use 0.0 oz/week      Comment: social rare       No Known Allergies      Review of Systems: All 12 systems reviewed and pertinent positives noted above. Lower Extremity Physical Examination:   Vitals:   Vitals:    11/08/17 1243   BP: 125/83   Pulse: 65     General: AAO x 3 in NAD. Integument: Warm, dry, supple bilaterally. HPKs present sub hallux bilateral sub 1st metatarsal left, on lateral aspect of 5th digit bilateral, and lateral aspect right foot. No interdigital macerations, or ulcerations present, bilaterally. Nails are thickened but normal in length to digits 1-5, bilaterally. Vascular: DP / PT pulses palpable 2/4 bilaterally. CFT < 3 seconds to digits 1-5 bilaterally. Hair growth present to the level of the digits bilaterally. No edema noted to lower extremity, bilaterally. Varicosities noted to the medial ankle, bilaterally. Neurological:  Sensation intact to 10/10 sites via 5.07 MDPE05O. Vibratory and proprioceptive sensation intact to the 1st MPJ, bilaterally. Negative tinel's sign bilateral.     Musculoskeletal: Muscle strength 5/5 in all lower extremity muscle groups, bilaterally. No pain or crepitus with PROM or AROM in any of the joints in the lower extremity, bilaterally. POP of calluses bilateral feet. Adductovarus rotation of 5th digit bilateral.     Biomechanical Exam:   Right foot: 1st MPJ: 35L 45U  Right foot: 1st Ray: 6D 5P       Right foot: Ankle DF knee extended: -3  Right foot: Ankle DF knee flexed: 2    Left foot: 1st MPJ: 32L 40U  Left foot: 1st Ray: 6D 4P  Left foot: Ankle DF knee extended: -4  Left foot: Ankle DF knee flexed: 3    Gait exam: Antalgic, early heel lift. Labs:   Lab Results   Component Value Date    LABA1C 5.5 10/02/2017     Lab Results   Component Value Date     07/02/2015       Asessment:   Sunday Geiger is a 56 y.o. yo male with   1. Callus     2. Pain in both feet     3. 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 Acquired digiti quinti varus deformity of right foot     5. Idiopathic neuropathy         Plan:   · Patient examined and evaluated. · Current condition and treatment options discussed in detail. · Debridement of hyperkeratotic tissue down to the level of skin with a # 15 blade without incident. · Advised patient to take ibuprofen 800 mg bid. · Rx for biomed cream.   · Will consider steroid injection in tibial nerve at next visit. · Will consider possible orthotics at next visit. · Return to clinic in 1 month.     Discussed with Dr. Larry Hernandez    Electronically signed by Sheryl Pickens DPM on 11/8/2017 at 2:03 PM

## 2017-11-08 NOTE — PROGRESS NOTES
Patient instructed to remove shoes and socks, instructed to sit in exam chair. Current PCP name is Dr Kristi Vera and date of last visit 10/02/2017. Do you have a follow up visit scheduled?   Yes   If yes the date is unkown

## 2017-12-02 DIAGNOSIS — I10 ESSENTIAL HYPERTENSION: ICD-10-CM

## 2017-12-04 RX ORDER — LISINOPRIL 5 MG/1
TABLET ORAL
Qty: 30 TABLET | Refills: 2 | Status: SHIPPED | OUTPATIENT
Start: 2017-12-04 | End: 2018-04-09 | Stop reason: SDUPTHER

## 2018-04-09 ENCOUNTER — OFFICE VISIT (OUTPATIENT)
Dept: INTERNAL MEDICINE | Age: 48
End: 2018-04-09
Payer: COMMERCIAL

## 2018-04-09 VITALS
HEART RATE: 57 BPM | BODY MASS INDEX: 28.88 KG/M2 | HEIGHT: 74 IN | SYSTOLIC BLOOD PRESSURE: 114 MMHG | DIASTOLIC BLOOD PRESSURE: 67 MMHG | WEIGHT: 225 LBS

## 2018-04-09 DIAGNOSIS — G89.29 CHRONIC BILATERAL LOW BACK PAIN WITHOUT SCIATICA: ICD-10-CM

## 2018-04-09 DIAGNOSIS — Z23 NEED FOR STREPTOCOCCUS PNEUMONIAE VACCINATION: ICD-10-CM

## 2018-04-09 DIAGNOSIS — M54.50 CHRONIC BILATERAL LOW BACK PAIN WITHOUT SCIATICA: ICD-10-CM

## 2018-04-09 DIAGNOSIS — G60.9 PERIPHERAL NEUROPATHY, IDIOPATHIC: ICD-10-CM

## 2018-04-09 DIAGNOSIS — Z13.1 SCREENING FOR DIABETES MELLITUS (DM): ICD-10-CM

## 2018-04-09 DIAGNOSIS — I10 ESSENTIAL HYPERTENSION: Primary | ICD-10-CM

## 2018-04-09 DIAGNOSIS — E78.2 MIXED HYPERLIPIDEMIA: ICD-10-CM

## 2018-04-09 LAB — HBA1C MFR BLD: 5.5 %

## 2018-04-09 PROCEDURE — 90732 PPSV23 VACC 2 YRS+ SUBQ/IM: CPT | Performed by: INTERNAL MEDICINE

## 2018-04-09 PROCEDURE — 90732 PPSV23 VACC 2 YRS+ SUBQ/IM: CPT

## 2018-04-09 PROCEDURE — 99214 OFFICE O/P EST MOD 30 MIN: CPT

## 2018-04-09 PROCEDURE — 99214 OFFICE O/P EST MOD 30 MIN: CPT | Performed by: INTERNAL MEDICINE

## 2018-04-09 PROCEDURE — G8427 DOCREV CUR MEDS BY ELIG CLIN: HCPCS | Performed by: INTERNAL MEDICINE

## 2018-04-09 PROCEDURE — G8419 CALC BMI OUT NRM PARAM NOF/U: HCPCS | Performed by: INTERNAL MEDICINE

## 2018-04-09 PROCEDURE — 83036 HEMOGLOBIN GLYCOSYLATED A1C: CPT | Performed by: INTERNAL MEDICINE

## 2018-04-09 PROCEDURE — 4004F PT TOBACCO SCREEN RCVD TLK: CPT | Performed by: INTERNAL MEDICINE

## 2018-04-09 PROCEDURE — 90471 IMMUNIZATION ADMIN: CPT | Performed by: INTERNAL MEDICINE

## 2018-04-09 RX ORDER — GABAPENTIN 100 MG/1
100 CAPSULE ORAL 3 TIMES DAILY
Qty: 90 CAPSULE | Refills: 3 | Status: SHIPPED | OUTPATIENT
Start: 2018-04-09 | End: 2018-08-26 | Stop reason: SDUPTHER

## 2018-04-09 RX ORDER — LISINOPRIL 5 MG/1
TABLET ORAL
Qty: 30 TABLET | Refills: 5 | Status: SHIPPED | OUTPATIENT
Start: 2018-04-09 | End: 2018-10-05 | Stop reason: SDUPTHER

## 2018-04-09 RX ORDER — IBUPROFEN 800 MG/1
800 TABLET ORAL DAILY PRN
COMMUNITY

## 2018-04-09 RX ORDER — ATORVASTATIN CALCIUM 20 MG/1
TABLET, FILM COATED ORAL
Qty: 30 TABLET | Refills: 11 | Status: SHIPPED | OUTPATIENT
Start: 2018-04-09 | End: 2018-10-05 | Stop reason: SDUPTHER

## 2018-04-09 ASSESSMENT — ENCOUNTER SYMPTOMS
CONSTIPATION: 0
COUGH: 0
ABDOMINAL PAIN: 0
BACK PAIN: 1
BLURRED VISION: 0
SPUTUM PRODUCTION: 0
NAUSEA: 0
EYE REDNESS: 0
WHEEZING: 0
SHORTNESS OF BREATH: 0

## 2018-08-26 DIAGNOSIS — R73.03 PREDIABETES: ICD-10-CM

## 2018-08-26 DIAGNOSIS — I10 HYPERTENSION: ICD-10-CM

## 2018-08-26 DIAGNOSIS — E78.5 HYPERLIPIDEMIA: ICD-10-CM

## 2018-08-26 DIAGNOSIS — G60.9 PERIPHERAL NEUROPATHY, IDIOPATHIC: ICD-10-CM

## 2018-08-28 RX ORDER — GABAPENTIN 100 MG/1
CAPSULE ORAL
Qty: 90 CAPSULE | Refills: 3 | Status: SHIPPED | OUTPATIENT
Start: 2018-08-28 | End: 2018-10-05 | Stop reason: SDUPTHER

## 2018-08-28 RX ORDER — ACETAMINOPHEN/DIPHENHYDRAMINE 500MG-25MG
TABLET ORAL
Qty: 30 TABLET | Refills: 11 | Status: SHIPPED | OUTPATIENT
Start: 2018-08-28 | End: 2019-09-12 | Stop reason: SDUPTHER

## 2018-08-30 ENCOUNTER — HOSPITAL ENCOUNTER (OUTPATIENT)
Age: 48
Setting detail: SPECIMEN
Discharge: HOME | End: 2018-08-30
Payer: COMMERCIAL

## 2018-08-30 DIAGNOSIS — E78.2 MIXED HYPERLIPIDEMIA: ICD-10-CM

## 2018-08-30 DIAGNOSIS — G60.9 PERIPHERAL NEUROPATHY, IDIOPATHIC: ICD-10-CM

## 2018-08-30 DIAGNOSIS — I10 ESSENTIAL HYPERTENSION: ICD-10-CM

## 2018-08-30 LAB
ANION GAP SERPL CALCULATED.3IONS-SCNC: 15 MMOL/L (ref 9–17)
BUN BLDV-MCNC: 14 MG/DL (ref 6–20)
BUN/CREAT BLD: NORMAL (ref 9–20)
CALCIUM SERPL-MCNC: 9.4 MG/DL (ref 8.6–10.4)
CHLORIDE BLD-SCNC: 105 MMOL/L (ref 98–107)
CO2: 23 MMOL/L (ref 20–31)
CREAT SERPL-MCNC: 0.93 MG/DL (ref 0.7–1.2)
FOLATE: 13.4 NG/ML
GFR AFRICAN AMERICAN: >60 ML/MIN
GFR NON-AFRICAN AMERICAN: >60 ML/MIN
GFR SERPL CREATININE-BSD FRML MDRD: NORMAL ML/MIN/{1.73_M2}
GFR SERPL CREATININE-BSD FRML MDRD: NORMAL ML/MIN/{1.73_M2}
GLUCOSE BLD-MCNC: 90 MG/DL (ref 70–99)
HCT VFR BLD CALC: 46.5 % (ref 40.7–50.3)
HEMOGLOBIN: 15.6 G/DL (ref 13–17)
MCH RBC QN AUTO: 31.1 PG (ref 25.2–33.5)
MCHC RBC AUTO-ENTMCNC: 33.5 G/DL (ref 28.4–34.8)
MCV RBC AUTO: 92.8 FL (ref 82.6–102.9)
NRBC AUTOMATED: 0 PER 100 WBC
PDW BLD-RTO: 13.7 % (ref 11.8–14.4)
PLATELET # BLD: 206 K/UL (ref 138–453)
PMV BLD AUTO: 10.4 FL (ref 8.1–13.5)
POTASSIUM SERPL-SCNC: 4.4 MMOL/L (ref 3.7–5.3)
RBC # BLD: 5.01 M/UL (ref 4.21–5.77)
SODIUM BLD-SCNC: 143 MMOL/L (ref 135–144)
TSH SERPL DL<=0.05 MIU/L-ACNC: 1.12 MIU/L (ref 0.3–5)
VITAMIN B-12: 425 PG/ML (ref 232–1245)
WBC # BLD: 7.4 K/UL (ref 3.5–11.3)

## 2018-08-30 PROCEDURE — 84443 ASSAY THYROID STIM HORMONE: CPT

## 2018-08-30 PROCEDURE — 80048 BASIC METABOLIC PNL TOTAL CA: CPT

## 2018-08-30 PROCEDURE — 82607 VITAMIN B-12: CPT

## 2018-08-30 PROCEDURE — 82746 ASSAY OF FOLIC ACID SERUM: CPT

## 2018-08-30 PROCEDURE — 36415 COLL VENOUS BLD VENIPUNCTURE: CPT

## 2018-08-30 PROCEDURE — 85027 COMPLETE CBC AUTOMATED: CPT

## 2018-10-05 ENCOUNTER — OFFICE VISIT (OUTPATIENT)
Dept: INTERNAL MEDICINE | Age: 48
End: 2018-10-05
Payer: COMMERCIAL

## 2018-10-05 VITALS
HEART RATE: 62 BPM | BODY MASS INDEX: 29.39 KG/M2 | SYSTOLIC BLOOD PRESSURE: 128 MMHG | DIASTOLIC BLOOD PRESSURE: 82 MMHG | OXYGEN SATURATION: 100 % | WEIGHT: 229 LBS | HEIGHT: 74 IN

## 2018-10-05 DIAGNOSIS — G60.9 PERIPHERAL NEUROPATHY, IDIOPATHIC: ICD-10-CM

## 2018-10-05 DIAGNOSIS — M54.50 ACUTE EXACERBATION OF CHRONIC LOW BACK PAIN: ICD-10-CM

## 2018-10-05 DIAGNOSIS — M54.42 CHRONIC BILATERAL LOW BACK PAIN WITH LEFT-SIDED SCIATICA: ICD-10-CM

## 2018-10-05 DIAGNOSIS — E78.2 MIXED HYPERLIPIDEMIA: ICD-10-CM

## 2018-10-05 DIAGNOSIS — R73.03 PREDIABETES: ICD-10-CM

## 2018-10-05 DIAGNOSIS — Z13.31 POSITIVE DEPRESSION SCREENING: ICD-10-CM

## 2018-10-05 DIAGNOSIS — I10 ESSENTIAL HYPERTENSION: Primary | ICD-10-CM

## 2018-10-05 DIAGNOSIS — G89.29 ACUTE EXACERBATION OF CHRONIC LOW BACK PAIN: ICD-10-CM

## 2018-10-05 DIAGNOSIS — G89.29 CHRONIC BILATERAL LOW BACK PAIN WITH LEFT-SIDED SCIATICA: ICD-10-CM

## 2018-10-05 PROCEDURE — G8431 POS CLIN DEPRES SCRN F/U DOC: HCPCS | Performed by: INTERNAL MEDICINE

## 2018-10-05 PROCEDURE — 96160 PT-FOCUSED HLTH RISK ASSMT: CPT | Performed by: INTERNAL MEDICINE

## 2018-10-05 PROCEDURE — 4004F PT TOBACCO SCREEN RCVD TLK: CPT | Performed by: INTERNAL MEDICINE

## 2018-10-05 PROCEDURE — G8427 DOCREV CUR MEDS BY ELIG CLIN: HCPCS | Performed by: INTERNAL MEDICINE

## 2018-10-05 PROCEDURE — 99211 OFF/OP EST MAY X REQ PHY/QHP: CPT | Performed by: INTERNAL MEDICINE

## 2018-10-05 PROCEDURE — G8419 CALC BMI OUT NRM PARAM NOF/U: HCPCS | Performed by: INTERNAL MEDICINE

## 2018-10-05 PROCEDURE — 99214 OFFICE O/P EST MOD 30 MIN: CPT | Performed by: INTERNAL MEDICINE

## 2018-10-05 PROCEDURE — G8484 FLU IMMUNIZE NO ADMIN: HCPCS | Performed by: INTERNAL MEDICINE

## 2018-10-05 RX ORDER — LISINOPRIL 5 MG/1
TABLET ORAL
Qty: 30 TABLET | Refills: 5 | Status: SHIPPED | OUTPATIENT
Start: 2018-10-05 | End: 2019-02-22 | Stop reason: SDUPTHER

## 2018-10-05 RX ORDER — TIZANIDINE 4 MG/1
4 TABLET ORAL EVERY 8 HOURS PRN
Qty: 30 TABLET | Refills: 0 | Status: SHIPPED | OUTPATIENT
Start: 2018-10-05 | End: 2019-09-26 | Stop reason: ALTCHOICE

## 2018-10-05 RX ORDER — ASPIRIN 81 MG/1
TABLET ORAL
Qty: 30 TABLET | Refills: 11 | Status: CANCELLED | OUTPATIENT
Start: 2018-10-05

## 2018-10-05 RX ORDER — ATORVASTATIN CALCIUM 20 MG/1
TABLET, FILM COATED ORAL
Qty: 30 TABLET | Refills: 11 | Status: SHIPPED | OUTPATIENT
Start: 2018-10-05 | End: 2019-10-01 | Stop reason: SDUPTHER

## 2018-10-05 RX ORDER — GABAPENTIN 100 MG/1
CAPSULE ORAL
Qty: 90 CAPSULE | Refills: 3 | Status: SHIPPED | OUTPATIENT
Start: 2018-10-05 | End: 2018-12-11 | Stop reason: SDUPTHER

## 2018-10-05 ASSESSMENT — PATIENT HEALTH QUESTIONNAIRE - PHQ9
3. TROUBLE FALLING OR STAYING ASLEEP: 0
SUM OF ALL RESPONSES TO PHQ QUESTIONS 1-9: 17
8. MOVING OR SPEAKING SO SLOWLY THAT OTHER PEOPLE COULD HAVE NOTICED. OR THE OPPOSITE, BEING SO FIGETY OR RESTLESS THAT YOU HAVE BEEN MOVING AROUND A LOT MORE THAN USUAL: 0
4. FEELING TIRED OR HAVING LITTLE ENERGY: 3
2. FEELING DOWN, DEPRESSED OR HOPELESS: 3
10. IF YOU CHECKED OFF ANY PROBLEMS, HOW DIFFICULT HAVE THESE PROBLEMS MADE IT FOR YOU TO DO YOUR WORK, TAKE CARE OF THINGS AT HOME, OR GET ALONG WITH OTHER PEOPLE: 3
9. THOUGHTS THAT YOU WOULD BE BETTER OFF DEAD, OR OF HURTING YOURSELF: 0
SUM OF ALL RESPONSES TO PHQ9 QUESTIONS 1 & 2: 6
5. POOR APPETITE OR OVEREATING: 2
SUM OF ALL RESPONSES TO PHQ QUESTIONS 1-9: 17
7. TROUBLE CONCENTRATING ON THINGS, SUCH AS READING THE NEWSPAPER OR WATCHING TELEVISION: 3
6. FEELING BAD ABOUT YOURSELF - OR THAT YOU ARE A FAILURE OR HAVE LET YOURSELF OR YOUR FAMILY DOWN: 3
1. LITTLE INTEREST OR PLEASURE IN DOING THINGS: 3

## 2018-10-05 ASSESSMENT — ENCOUNTER SYMPTOMS
BACK PAIN: 1
CONSTIPATION: 1

## 2018-10-05 NOTE — PROGRESS NOTES
bone marrow signal alteration. The finding may    related to smoking, anemia, osteopenia or diffuse pulmonary disease    including neoplastic processes such as leukemia. Please correlate    clinically.        Minor degenerative changes centered at L4-L5 disc space level without    central canal narrowing with evidence of neural foraminal narrowing       Report Electronically signed by Toro Sousa M.D. on 8/13/2014 12:26 PM         Past Medical History:   Diagnosis Date    Asthma     Chronic back pain     Headache(784.0)     HLD (hyperlipidemia) 11/6/2013    Hypertension 7/10/2014    Obesity 10/10/2013    Substance abuse ([facility] [address]     Type II or unspecified type diabetes mellitus without mention of complication, not stated as uncontrolled        No past surgical history on file. ALLERGIES    No Known Allergies    MEDICATIONS:      Current Outpatient Prescriptions on File Prior to Visit   Medication Sig Dispense Refill    RA ASPIRIN EC ADULT LOW ST 81 MG EC tablet take 1 tablet by mouth once daily 30 tablet 11    gabapentin (NEURONTIN) 100 MG capsule take 1 capsule by mouth three times a day 90 capsule 3    ibuprofen (ADVIL;MOTRIN) 800 MG tablet Take 800 mg by mouth daily as needed for Pain      lisinopril (PRINIVIL;ZESTRIL) 5 MG tablet take 1 tablet by mouth once daily 30 tablet 5    atorvastatin (LIPITOR) 20 MG tablet take 1 tablet by mouth once daily 30 tablet 11    VENTOLIN  (90 BASE) MCG/ACT inhaler INHALE 2 PUFFS EVERY SIX HOURS AS NEEDED FOR WHEEZING 18 g 11     No current facility-administered medications on file prior to visit. SOCIAL HISTORY    Reviewed and no change from previous record. Maxine Benson  reports that he has been smoking Cigars. He has a 10.00 pack-year smoking history.  He has never used smokeless tobacco.    FAMILY HISTORY:    Reviewed and No change from previous visit    HEALTH MAINTENANCE DUE:      Health Maintenance Due   Topic Date Due    HIV screen  03/01/1986

## 2018-10-07 ASSESSMENT — ENCOUNTER SYMPTOMS
ABDOMINAL PAIN: 0
NAUSEA: 0
SHORTNESS OF BREATH: 0
SPUTUM PRODUCTION: 0
BLURRED VISION: 0
BLOOD IN STOOL: 0
COUGH: 0
EYE REDNESS: 0

## 2018-10-10 ENCOUNTER — HOSPITAL ENCOUNTER (OUTPATIENT)
Dept: PAIN MANAGEMENT | Age: 48
Discharge: HOME | End: 2018-10-10
Payer: COMMERCIAL

## 2018-10-10 DIAGNOSIS — F12.90 MARIJUANA USE: Chronic | ICD-10-CM

## 2018-10-10 DIAGNOSIS — G89.29 CHRONIC BILATERAL LOW BACK PAIN WITHOUT SCIATICA: Primary | ICD-10-CM

## 2018-10-10 DIAGNOSIS — M54.50 CHRONIC BILATERAL LOW BACK PAIN WITHOUT SCIATICA: Primary | ICD-10-CM

## 2018-10-10 DIAGNOSIS — M51.9 LUMBAR DISC DISEASE: Chronic | ICD-10-CM

## 2018-10-17 ENCOUNTER — OFFICE VISIT (OUTPATIENT)
Dept: PODIATRY | Age: 48
End: 2018-10-17
Payer: COMMERCIAL

## 2018-10-17 ENCOUNTER — HOSPITAL ENCOUNTER (OUTPATIENT)
Age: 48
Discharge: HOME | End: 2018-10-19
Payer: COMMERCIAL

## 2018-10-17 ENCOUNTER — HOSPITAL ENCOUNTER (OUTPATIENT)
Dept: GENERAL RADIOLOGY | Age: 48
Discharge: HOME | End: 2018-10-19
Payer: COMMERCIAL

## 2018-10-17 VITALS
HEIGHT: 74 IN | WEIGHT: 232.2 LBS | SYSTOLIC BLOOD PRESSURE: 138 MMHG | HEART RATE: 62 BPM | DIASTOLIC BLOOD PRESSURE: 88 MMHG | BODY MASS INDEX: 29.8 KG/M2

## 2018-10-17 DIAGNOSIS — G62.9 NEUROPATHY: Primary | ICD-10-CM

## 2018-10-17 DIAGNOSIS — L60.3 DYSTROPHIC NAIL: ICD-10-CM

## 2018-10-17 DIAGNOSIS — M54.50 ACUTE EXACERBATION OF CHRONIC LOW BACK PAIN: ICD-10-CM

## 2018-10-17 DIAGNOSIS — L84 CALLUS OF FOOT: ICD-10-CM

## 2018-10-17 DIAGNOSIS — M51.9 LUMBAR DISC DISEASE: ICD-10-CM

## 2018-10-17 DIAGNOSIS — G89.29 ACUTE EXACERBATION OF CHRONIC LOW BACK PAIN: ICD-10-CM

## 2018-10-17 PROCEDURE — 11056 PARNG/CUTG B9 HYPRKR LES 2-4: CPT | Performed by: STUDENT IN AN ORGANIZED HEALTH CARE EDUCATION/TRAINING PROGRAM

## 2018-10-17 PROCEDURE — 11721 DEBRIDE NAIL 6 OR MORE: CPT | Performed by: STUDENT IN AN ORGANIZED HEALTH CARE EDUCATION/TRAINING PROGRAM

## 2018-10-17 PROCEDURE — 99212 OFFICE O/P EST SF 10 MIN: CPT

## 2018-10-17 PROCEDURE — 72100 X-RAY EXAM L-S SPINE 2/3 VWS: CPT

## 2018-10-17 PROCEDURE — 99213 OFFICE O/P EST LOW 20 MIN: CPT | Performed by: STUDENT IN AN ORGANIZED HEALTH CARE EDUCATION/TRAINING PROGRAM

## 2018-10-17 NOTE — PROGRESS NOTES
Podiatry Clinic Note    Subjective:   Linh Hester is a 53 y.o. male who presents to the office today complaining of painful nails, calluses and burning sensation of his arch and toes. He reports occasional numbness, tingling and sharp shooting pain that started 1 year ago. He reports his symptoms started 1 year ago after falling off a balcony. Changing the position of his foot alleviates his symptoms. Last visit he was given a prescription for biomed cream.  Patient states he used to daily for 1 month with no relief. Patient reports occasionally smoking. Patient denies being an alcoholic. The patient states that his diet is complete and does not believe he is deficient in vitamin B. Currently denies F/C/N/V. Past Medical History:   Diagnosis Date    Asthma     Chronic back pain     Headache(784.0)     HLD (hyperlipidemia) 11/6/2013    Hypertension 7/10/2014    Obesity 10/10/2013    Substance abuse ([facility] [address]     Type II or unspecified type diabetes mellitus without mention of complication, not stated as uncontrolled        Prior to Admission medications    Medication Sig Start Date End Date Taking? Authorizing Provider   atorvastatin (LIPITOR) 20 MG tablet take 1 tablet by mouth once daily 10/5/18   Emma Chadwick MD   gabapentin (NEURONTIN) 100 MG capsule take 1 capsule by mouth three times a day.  10/5/18 10/5/18  Emma Chadwick MD   lisinopril (PRINIVIL;ZESTRIL) 5 MG tablet take 1 tablet by mouth once daily 10/5/18   Emma Chadwick MD   tiZANidine (ZANAFLEX) 4 MG tablet Take 1 tablet by mouth every 8 hours as needed (spasms) 10/5/18   Emma Chadwick MD   RA ASPIRIN EC ADULT LOW ST 81 MG EC tablet take 1 tablet by mouth once daily 8/28/18   Emma Chadwick MD   ibuprofen (ADVIL;MOTRIN) 800 MG tablet Take 800 mg by mouth daily as needed for Pain    Historical Provider, MD BERGMAN  (90 BASE) MCG/ACT inhaler INHALE 2 PUFFS EVERY SIX HOURS AS NEEDED FOR WHEEZING 7/7/14   [facility]

## 2018-10-17 NOTE — PROGRESS NOTES
Patient instructed to remove shoes and socks, instructed to sit in exam chair. Current PCP name is Dr Mojgan Browne and date of last visit 10-9-18. Do you have a follow up visit scheduled?   Yes or no    If yes the date is 1-22-19

## 2018-10-22 ENCOUNTER — HOSPITAL ENCOUNTER (OUTPATIENT)
Dept: PHYSICAL THERAPY | Age: 48
Setting detail: THERAPIES SERIES
Discharge: HOME | End: 2018-10-22
Payer: COMMERCIAL

## 2018-10-22 PROCEDURE — 97161 PT EVAL LOW COMPLEX 20 MIN: CPT

## 2018-10-22 PROCEDURE — 97530 THERAPEUTIC ACTIVITIES: CPT

## 2018-10-22 PROCEDURE — 97110 THERAPEUTIC EXERCISES: CPT

## 2018-10-22 NOTE — CONSULTS
[x] [facility]  [address]  [phone]  F: [phone]     Physical Therapy Spine Evaluation    Date:  10/22/2018  Patient: Jacky Amin  : 1970  MRN: [id number]  Physician: Génesis Pace MD     Insurance: [facility] (30 visits remain at the beginning of this episode)  Medical Diagnosis:Acute exacerbation of chronic low back pain (M54.5,G89.29)     Rehab Codes: stiffness of the low back (M45.06); low back pain (M54.5); muscle wekness (M62.81); disturbance of skin sensation (R20.8); Onset Date: ~2016  Next 's appt. : 2019    Subjective:   CC:low back, feels pain into the buttocks on both sides  HPI: (onset date) : 2016 fell off a balcony 20 feet high and since that time has continued to have back pain     PMHx: [] Unremarkable [] Diabetes [] HTN  [] Pacemaker   [] MI/Heart Problems [] Cancer [] Arthritis [] Other:              [] Refer to full medical chart  In EPIC   Tests: [x] X-Ray: () [x] MRI:  ()  [] Other:    Medications: [x] Refer to full medical record [] None [] Other:  Allergies:      [x] Refer to full medical record [] None [] Other:    Function:  Hand Dominance  [x] Right  [] Left  Working:  [] Normal Duty  [] Light Duty  [] Off D/T Condition  [] Retired  [] Not Employed                  []  Disability  [] Other:           Return to work:   [employer]: watch television and listen to music  Pain:  [x] Yes  [] No Location: low back - across the back and the buttocks Pain Rating: (0-10 scale) 8/10  Pain altered Tx:  [] Yes  [] No  Action:  Symptoms:  [] Improving [] Worsening [x] Same  Better:  [] AM    [] PM    [] Sit    [] Rise/Sit    []Stand    [] Walk    [] Lying    [x] Other:heat, short term  Worse: [] AM    [] PM    [] Sit    [] Rise/Sit    []Stand    [] Walk    [] Lying    [x] Bend                             [] Valsalva    [x] Other:\"certain positions\"   Sleep: [] OK    [x] Manual Therapy             Phosphate 40-80 mAmin  [] Aquatic Therapy   [x] Dry Needling [] Other:    []  Medication allergies reviewed for use of    Dexamethasone Sodium Phosphate 4mg/ml     with iontophoresis treatments. Pt is not allergic.     Frequency:  2 x/week for 10 visits    Todays Treatment:  Modalities:   Precautions:  Exercises:  Exercise Reps/ Time Weight/ Level Comments   Single knee to chest 5 reps  Verbal instruct with demo for home use; written instructions/education issued to the patient   Stretch external rotators 5 reps  Verbal instruct with demo for home use; written instructions/education issued to the patient   Hamstring stretch (double leg) 3 reps  Verbal instruct with demo for home use; written instructions/education issued to the patient   Hamstring stretch (head to knee) 3 reps  Verbal instruct with demo for home use; written instructions/education issued to the patient         Other:  Therapeutic activity: positioning in bed in side lying and supine with pillow between legs in side lying and patient rolled past 90 ° of side lying and patient poisitioned in supine with two pillows under his head and pillow under the thighs    Specific Instructions for next treatment: review previous exercises, begin core strengthening and dynamic stabilization - progress to the ball      Evaluation Complexity:  History (Personal factors, comorbidities) [] 0 [] 1-2 [x] 3+   Exam (limitations, restrictions) [] 1-2 [x] 3 [] 4+   Clinical presentation (progression) [x] Stable [] Evolving  [] Unstable   Decision Making [x] Low [] Moderate [] High    [x] Low Complexity [] Moderate Complexity [] High Complexity       Treatment Charges: Mins Units   [x] Evaluation       [x]  Low       []  Moderate       []  High 30 1   []  Modalities     [x]  Ther Exercise 15 1   []  Manual Therapy     [x]  Ther Activities 10 1   []  Aquatics     []  Vasocompression     []  Other       TOTAL TREATMENT TIME: 55    Time in: 1100

## 2018-10-24 ENCOUNTER — HOSPITAL ENCOUNTER (OUTPATIENT)
Dept: PHYSICAL THERAPY | Age: 48
Setting detail: THERAPIES SERIES
Discharge: HOME | End: 2018-10-24
Payer: COMMERCIAL

## 2018-10-29 ENCOUNTER — HOSPITAL ENCOUNTER (OUTPATIENT)
Dept: PHYSICAL THERAPY | Age: 48
Setting detail: THERAPIES SERIES
Discharge: HOME | End: 2018-10-29
Payer: COMMERCIAL

## 2018-11-08 ENCOUNTER — HOSPITAL ENCOUNTER (OUTPATIENT)
Dept: PHYSICAL THERAPY | Age: 48
Setting detail: THERAPIES SERIES
Discharge: HOME | End: 2018-11-08
Payer: COMMERCIAL

## 2018-11-08 PROCEDURE — 97110 THERAPEUTIC EXERCISES: CPT

## 2018-11-08 NOTE — FLOWSHEET NOTE
[x] Rashmi Santillan       Outpatient Physical        Therapy       [address]       Phone: [phone]       Fax: [phone]     Physical Therapy Daily Treatment Note    Date:  2018  Patient Name:  Romain Chan      :  1970    MRN: [id number]  Physician: Nicho Pereira MD                                Insurance: [facility] (30 visits remain at the beginning of this episode)  Medical Diagnosis:Acute exacerbation of chronic low back pain (M54.5,G89.29)                           Rehab Codes: stiffness of the low back (M45.06); low back pain (M54.5); muscle wekness (M62.81); disturbance of skin sensation (R20.8); Onset Date: ~2016                       Next 's appt. : 2019    Visit# / total visits: 2/10  Cancels/No Shows: 0/1    Subjective:    Pain:  [] Yes  [] No Location: midline low back   Pain Rating: (0-10 scale) 8/10  Pain altered Tx:  [] No  [] Yes  Action:  Comments: Patient arrives to clinic this date, stating his back is very sore. Unchanged since initial evaluation. Objective:  Modalities: Large CP to low back, seated at end of Tx, 10 min  Precautions:  Exercises:  Exercise Reps/ Time Weight/ Level Comments   NuStep 7 min Level 5          Supine      LTR 5x15\"     SKTC 5x15\"     Hamstring Stretch w/belt 3x30\"     Piriformis Stretch 3x30\"  Knee to opposite shoulder   SLR 10x     Bridge 10x     Marching w/TrA Iso 20x     Heel Walkouts 10x     Clamshells 20x Green hooklying   Sidelying Hip Abduction 10x                       Other:    Specific Instructions for next treatment: review previous exercises, begin core strengthening and dynamic stabilization - progress to the ball     Treatment Charges: Mins Units   [x]  CP 10 0   [x]  Ther Exercise 40 3   []  Manual Therapy     []  Ther Activities     []  Aquatics     []  Vasocompression     []  Other     Total Treatment time 40 3       Assessment: [x] Progressing toward goals.  Pt reports low back/hip stretches felt good, with reduction in pain following. CP donned at end of tx to aide in pain management, and pt was educated in use of CP/HP in conjunction with HEP to improve ROM/strength in his hips and core musculature. [] No change. [] Other:    STG: (to be met in 10 treatments)  1. ? Pain:patient will be able to find two comfortable resting positions in which the back pain is 4/10 or less  2. ? ROM:patient will have increased hamstring flexibility to 85 ° bilateral   3. ? Strength:increase core strength to 4+/5 or better to provide increased stability for the low back  4. ? Function:patient will have an Oswestry score of 20% or better  5. Independent with Home Exercise Programs  6. Demonstrate Knowledge of fall prevention  7. Patient will have increased postural awareness for changing positions from sit to stand and stand to sit and also for static positioning when sitting and standing      Patient goals: to get better    Pt. Education:  [x] Yes  [] No  [x] Reviewed Prior HEP/Ed  Method of Education: [x] Verbal  [x] Demo  [x] Written  Comprehension of Education:  [x] Verbalizes understanding. [] Demonstrates understanding. [x] Needs review. [] Demonstrates/verbalizes HEP/Ed previously given. 11/8/18 - verbal education in use of HP/CP,      Plan: [x] Continue per plan of care.    [] Other:   Frequency:  2 x/week for 10 visits      Time In: 1125            Time Out: 3748    Electronically signed by:  Sid Ortiz PTA

## 2018-12-11 DIAGNOSIS — G60.9 PERIPHERAL NEUROPATHY, IDIOPATHIC: ICD-10-CM

## 2018-12-11 DIAGNOSIS — M54.42 CHRONIC BILATERAL LOW BACK PAIN WITH LEFT-SIDED SCIATICA: ICD-10-CM

## 2018-12-11 DIAGNOSIS — G89.29 CHRONIC BILATERAL LOW BACK PAIN WITH LEFT-SIDED SCIATICA: ICD-10-CM

## 2018-12-12 RX ORDER — GABAPENTIN 100 MG/1
CAPSULE ORAL
Qty: 90 CAPSULE | Refills: 1 | Status: SHIPPED | OUTPATIENT
Start: 2018-12-12 | End: 2019-03-06 | Stop reason: ALTCHOICE

## 2019-01-15 ENCOUNTER — HOSPITAL ENCOUNTER (OUTPATIENT)
Dept: PHYSICAL THERAPY | Age: 49
Setting detail: THERAPIES SERIES
Discharge: HOME | End: 2019-01-15
Payer: COMMERCIAL

## 2019-01-29 ENCOUNTER — HOSPITAL ENCOUNTER (OUTPATIENT)
Dept: NEUROLOGY | Age: 49
Discharge: HOME | End: 2019-01-29
Payer: COMMERCIAL

## 2019-01-29 DIAGNOSIS — G60.9 PERIPHERAL NEUROPATHY, IDIOPATHIC: ICD-10-CM

## 2019-01-29 DIAGNOSIS — M54.50 CHRONIC BILATERAL LOW BACK PAIN WITHOUT SCIATICA: ICD-10-CM

## 2019-01-29 DIAGNOSIS — G89.29 CHRONIC BILATERAL LOW BACK PAIN WITHOUT SCIATICA: ICD-10-CM

## 2019-01-29 PROCEDURE — 95911 NRV CNDJ TEST 9-10 STUDIES: CPT | Performed by: PHYSICAL MEDICINE & REHABILITATION

## 2019-01-29 PROCEDURE — 95886 MUSC TEST DONE W/N TEST COMP: CPT | Performed by: PHYSICAL MEDICINE & REHABILITATION

## 2019-02-22 DIAGNOSIS — I10 ESSENTIAL HYPERTENSION: ICD-10-CM

## 2019-02-23 RX ORDER — LISINOPRIL 5 MG/1
TABLET ORAL
Qty: 30 TABLET | Refills: 2 | Status: SHIPPED | OUTPATIENT
Start: 2019-02-23 | End: 2019-10-01 | Stop reason: SDUPTHER

## 2019-03-06 ENCOUNTER — OFFICE VISIT (OUTPATIENT)
Dept: INTERNAL MEDICINE | Age: 49
End: 2019-03-06
Payer: COMMERCIAL

## 2019-03-06 VITALS
WEIGHT: 241 LBS | BODY MASS INDEX: 30.93 KG/M2 | HEART RATE: 69 BPM | HEIGHT: 74 IN | SYSTOLIC BLOOD PRESSURE: 122 MMHG | DIASTOLIC BLOOD PRESSURE: 79 MMHG

## 2019-03-06 DIAGNOSIS — Z11.3 SCREEN FOR STD (SEXUALLY TRANSMITTED DISEASE): ICD-10-CM

## 2019-03-06 DIAGNOSIS — R73.02 IGT (IMPAIRED GLUCOSE TOLERANCE): ICD-10-CM

## 2019-03-06 DIAGNOSIS — F17.200 SMOKER: ICD-10-CM

## 2019-03-06 DIAGNOSIS — I10 ESSENTIAL HYPERTENSION: Primary | ICD-10-CM

## 2019-03-06 DIAGNOSIS — G89.29 CHRONIC BILATERAL LOW BACK PAIN WITHOUT SCIATICA: ICD-10-CM

## 2019-03-06 DIAGNOSIS — E66.09 CLASS 1 OBESITY DUE TO EXCESS CALORIES WITH SERIOUS COMORBIDITY AND BODY MASS INDEX (BMI) OF 30.0 TO 30.9 IN ADULT: ICD-10-CM

## 2019-03-06 DIAGNOSIS — M54.50 CHRONIC BILATERAL LOW BACK PAIN WITHOUT SCIATICA: ICD-10-CM

## 2019-03-06 DIAGNOSIS — J45.20 MILD INTERMITTENT ASTHMA WITHOUT COMPLICATION: ICD-10-CM

## 2019-03-06 DIAGNOSIS — E78.2 MIXED HYPERLIPIDEMIA: ICD-10-CM

## 2019-03-06 PROCEDURE — G8484 FLU IMMUNIZE NO ADMIN: HCPCS | Performed by: INTERNAL MEDICINE

## 2019-03-06 PROCEDURE — G8427 DOCREV CUR MEDS BY ELIG CLIN: HCPCS | Performed by: INTERNAL MEDICINE

## 2019-03-06 PROCEDURE — G8417 CALC BMI ABV UP PARAM F/U: HCPCS | Performed by: INTERNAL MEDICINE

## 2019-03-06 PROCEDURE — 99211 OFF/OP EST MAY X REQ PHY/QHP: CPT | Performed by: INTERNAL MEDICINE

## 2019-03-06 PROCEDURE — 99213 OFFICE O/P EST LOW 20 MIN: CPT | Performed by: INTERNAL MEDICINE

## 2019-03-06 PROCEDURE — 4004F PT TOBACCO SCREEN RCVD TLK: CPT | Performed by: INTERNAL MEDICINE

## 2019-03-06 ASSESSMENT — PATIENT HEALTH QUESTIONNAIRE - PHQ9
SUM OF ALL RESPONSES TO PHQ QUESTIONS 1-9: 2
1. LITTLE INTEREST OR PLEASURE IN DOING THINGS: 1
2. FEELING DOWN, DEPRESSED OR HOPELESS: 1
SUM OF ALL RESPONSES TO PHQ9 QUESTIONS 1 & 2: 2
SUM OF ALL RESPONSES TO PHQ QUESTIONS 1-9: 2

## 2019-03-08 ENCOUNTER — HOSPITAL ENCOUNTER (OUTPATIENT)
Age: 49
Setting detail: SPECIMEN
Discharge: HOME | End: 2019-03-08
Payer: COMMERCIAL

## 2019-03-08 DIAGNOSIS — E78.2 MIXED HYPERLIPIDEMIA: ICD-10-CM

## 2019-03-08 DIAGNOSIS — Z11.3 SCREEN FOR STD (SEXUALLY TRANSMITTED DISEASE): ICD-10-CM

## 2019-03-08 DIAGNOSIS — I10 ESSENTIAL HYPERTENSION: ICD-10-CM

## 2019-03-08 DIAGNOSIS — R73.02 IGT (IMPAIRED GLUCOSE TOLERANCE): ICD-10-CM

## 2019-03-08 LAB
ALBUMIN SERPL-MCNC: 4.4 G/DL (ref 3.5–5.2)
ALBUMIN/GLOBULIN RATIO: 1.4 (ref 1–2.5)
ALP BLD-CCNC: 71 U/L (ref 40–129)
ALT SERPL-CCNC: 16 U/L (ref 5–41)
ANION GAP SERPL CALCULATED.3IONS-SCNC: 13 MMOL/L (ref 9–17)
AST SERPL-CCNC: 15 U/L
BILIRUB SERPL-MCNC: 0.38 MG/DL (ref 0.3–1.2)
BUN BLDV-MCNC: 15 MG/DL (ref 6–20)
BUN/CREAT BLD: ABNORMAL (ref 9–20)
CALCIUM SERPL-MCNC: 9.3 MG/DL (ref 8.6–10.4)
CHLORIDE BLD-SCNC: 109 MMOL/L (ref 98–107)
CHOLESTEROL/HDL RATIO: 4.8
CHOLESTEROL: 163 MG/DL
CO2: 23 MMOL/L (ref 20–31)
CREAT SERPL-MCNC: 0.89 MG/DL (ref 0.7–1.2)
ESTIMATED AVERAGE GLUCOSE: 120 MG/DL
GFR AFRICAN AMERICAN: >60 ML/MIN
GFR NON-AFRICAN AMERICAN: >60 ML/MIN
GFR SERPL CREATININE-BSD FRML MDRD: ABNORMAL ML/MIN/{1.73_M2}
GFR SERPL CREATININE-BSD FRML MDRD: ABNORMAL ML/MIN/{1.73_M2}
GLUCOSE BLD-MCNC: 92 MG/DL (ref 70–99)
HBA1C MFR BLD: 5.8 % (ref 4–6)
HDLC SERPL-MCNC: 34 MG/DL
HIV AG/AB: NONREACTIVE
LDL CHOLESTEROL: 112 MG/DL (ref 0–130)
POTASSIUM SERPL-SCNC: 4.4 MMOL/L (ref 3.7–5.3)
SODIUM BLD-SCNC: 145 MMOL/L (ref 135–144)
TOTAL PROTEIN: 7.5 G/DL (ref 6.4–8.3)
TRIGL SERPL-MCNC: 86 MG/DL
VLDLC SERPL CALC-MCNC: ABNORMAL MG/DL (ref 1–30)

## 2019-03-08 PROCEDURE — 80053 COMPREHEN METABOLIC PANEL: CPT

## 2019-03-08 PROCEDURE — 87591 N.GONORRHOEAE DNA AMP PROB: CPT

## 2019-03-08 PROCEDURE — 80061 LIPID PANEL: CPT

## 2019-03-08 PROCEDURE — 87491 CHLMYD TRACH DNA AMP PROBE: CPT

## 2019-03-08 PROCEDURE — 83036 HEMOGLOBIN GLYCOSYLATED A1C: CPT

## 2019-03-08 PROCEDURE — 87389 HIV-1 AG W/HIV-1&-2 AB AG IA: CPT

## 2019-03-08 PROCEDURE — 36415 COLL VENOUS BLD VENIPUNCTURE: CPT

## 2019-03-11 LAB
C. TRACHOMATIS DNA ,URINE: NEGATIVE
N. GONORRHOEAE DNA, URINE: NEGATIVE
SPECIMEN DESCRIPTION: NORMAL

## 2019-09-12 DIAGNOSIS — I10 HYPERTENSION: ICD-10-CM

## 2019-09-12 DIAGNOSIS — E78.5 HYPERLIPIDEMIA: ICD-10-CM

## 2019-09-12 DIAGNOSIS — R73.03 PREDIABETES: ICD-10-CM

## 2019-09-12 RX ORDER — ASPIRIN 81 MG/1
TABLET, COATED ORAL
Qty: 30 TABLET | Refills: 11 | Status: SHIPPED | OUTPATIENT
Start: 2019-09-12

## 2019-09-12 NOTE — TELEPHONE ENCOUNTER
Refill request for Aspirin. If appropriate please send medication(s) to patients pharmacy. Next appt: 09/26/2019    Health Maintenance   Topic Date Due    Flu vaccine (1) 09/01/2019    A1C test (Diabetic or Prediabetic)  03/08/2020    Potassium monitoring  03/08/2020    Creatinine monitoring  03/08/2020    Lipid screen  03/08/2024    DTaP/Tdap/Td vaccine (2 - Td) 10/04/2026    Pneumococcal 0-64 years Vaccine  Completed    HIV screen  Completed       Hemoglobin A1C (%)   Date Value   03/08/2019 5.8   04/09/2018 5.5   10/02/2017 5.5             ( goal A1C is < 7)   Microalb/Crt.  Ratio (mcg/mg creat)   Date Value   04/08/2016 6     LDL Cholesterol (mg/dL)   Date Value   03/08/2019 112       (goal LDL is <100)   AST (U/L)   Date Value   03/08/2019 15     ALT (U/L)   Date Value   03/08/2019 16     BUN (mg/dL)   Date Value   03/08/2019 15     BP Readings from Last 3 Encounters:   03/06/19 122/79   10/17/18 138/88   10/05/18 128/82          (goal 120/80)          Patient Active Problem List:     Asthma     Back pain     Obesity     Hyperlipidemia     Hypertension     Chronic bilateral low back pain without sciatica     Lumbar disc disease     Marijuana use

## 2019-09-26 ENCOUNTER — HOSPITAL ENCOUNTER (EMERGENCY)
Age: 49
Discharge: HOME | End: 2019-09-27
Attending: EMERGENCY MEDICINE
Payer: COMMERCIAL

## 2019-09-26 VITALS
DIASTOLIC BLOOD PRESSURE: 87 MMHG | BODY MASS INDEX: 27.78 KG/M2 | WEIGHT: 216.5 LBS | SYSTOLIC BLOOD PRESSURE: 154 MMHG | OXYGEN SATURATION: 98 % | RESPIRATION RATE: 16 BRPM | HEIGHT: 74 IN | HEART RATE: 65 BPM | TEMPERATURE: 97.9 F

## 2019-09-26 DIAGNOSIS — R10.12 ABDOMINAL PAIN, LEFT UPPER QUADRANT: ICD-10-CM

## 2019-09-26 DIAGNOSIS — R10.13 ABDOMINAL PAIN, EPIGASTRIC: Primary | ICD-10-CM

## 2019-09-26 PROCEDURE — 99283 EMERGENCY DEPT VISIT LOW MDM: CPT

## 2019-09-26 ASSESSMENT — PAIN SCALES - GENERAL: PAINLEVEL_OUTOF10: 5

## 2019-09-26 ASSESSMENT — ENCOUNTER SYMPTOMS
ABDOMINAL PAIN: 1
VOMITING: 0
SHORTNESS OF BREATH: 0
DIARRHEA: 0
NAUSEA: 0
CONSTIPATION: 0

## 2019-09-27 LAB
ABSOLUTE EOS #: 0.18 K/UL (ref 0–0.44)
ABSOLUTE IMMATURE GRANULOCYTE: 0 K/UL (ref 0–0.3)
ABSOLUTE LYMPH #: 2.43 K/UL (ref 1.1–3.7)
ABSOLUTE MONO #: 0.45 K/UL (ref 0.1–1.2)
ALBUMIN SERPL-MCNC: 4.4 G/DL (ref 3.5–5.2)
ALBUMIN/GLOBULIN RATIO: ABNORMAL (ref 1–2.5)
ALP BLD-CCNC: 71 U/L (ref 40–129)
ALT SERPL-CCNC: 9 U/L (ref 5–41)
ANION GAP SERPL CALCULATED.3IONS-SCNC: 12 MMOL/L (ref 9–17)
AST SERPL-CCNC: 15 U/L
BASOPHILS # BLD: 0 % (ref 0–2)
BASOPHILS ABSOLUTE: <0.03 K/UL (ref 0–0.2)
BILIRUB SERPL-MCNC: 0.5 MG/DL (ref 0.3–1.2)
BILIRUBIN, POC: NORMAL
BLOOD URINE, POC: NEGATIVE
BUN BLDV-MCNC: 9 MG/DL (ref 6–20)
BUN/CREAT BLD: 9 (ref 9–20)
C. TRACHOMATIS DNA ,URINE: NEGATIVE
CALCIUM SERPL-MCNC: 9 MG/DL (ref 8.6–10.4)
CHLORIDE BLD-SCNC: 100 MMOL/L (ref 98–107)
CHP ED QC CHECK: NORMAL
CLARITY, POC: NORMAL
CO2: 27 MMOL/L (ref 20–31)
COLOR, POC: NORMAL
CREAT SERPL-MCNC: 0.99 MG/DL (ref 0.7–1.2)
DIFFERENTIAL TYPE: ABNORMAL
EOSINOPHILS RELATIVE PERCENT: 4 % (ref 1–4)
GFR AFRICAN AMERICAN: >60 ML/MIN
GFR NON-AFRICAN AMERICAN: >60 ML/MIN
GFR SERPL CREATININE-BSD FRML MDRD: ABNORMAL ML/MIN/{1.73_M2}
GFR SERPL CREATININE-BSD FRML MDRD: ABNORMAL ML/MIN/{1.73_M2}
GLUCOSE BLD-MCNC: 110 MG/DL (ref 70–99)
GLUCOSE URINE, POC: NEGATIVE
HCT VFR BLD CALC: 42.6 % (ref 40.7–50.3)
HEMOGLOBIN: 14.1 G/DL (ref 13–17)
IMMATURE GRANULOCYTES: 0 %
KETONES, POC: NEGATIVE
LEUKOCYTE EST, POC: NORMAL
LIPASE: 19 U/L (ref 13–60)
LYMPHOCYTES # BLD: 47 % (ref 24–43)
MCH RBC QN AUTO: 30.8 PG (ref 25.2–33.5)
MCHC RBC AUTO-ENTMCNC: 33.1 G/DL (ref 28–38)
MCV RBC AUTO: 93 FL (ref 82.6–102.9)
MONOCYTES # BLD: 9 % (ref 3–12)
N. GONORRHOEAE DNA, URINE: NEGATIVE
NITRITE, POC: NEGATIVE
NRBC AUTOMATED: ABNORMAL PER 100 WBC
PDW BLD-RTO: 13.9 % (ref 11.8–14.4)
PH, POC: 6
PLATELET # BLD: 204 K/UL (ref 138–453)
PLATELET ESTIMATE: ABNORMAL
PMV BLD AUTO: 9.7 FL (ref 8.1–13.5)
POTASSIUM SERPL-SCNC: 3.6 MMOL/L (ref 3.7–5.3)
PROTEIN, POC: NORMAL
RBC # BLD: 4.58 M/UL (ref 4.21–5.77)
RBC # BLD: ABNORMAL 10*6/UL
SEG NEUTROPHILS: 41 % (ref 36–65)
SEGMENTED NEUTROPHILS ABSOLUTE COUNT: 2.14 K/UL (ref 1.5–8.1)
SODIUM BLD-SCNC: 139 MMOL/L (ref 135–144)
SPECIFIC GRAVITY, POC: >=1.03
SPECIMEN DESCRIPTION: NORMAL
TOTAL PROTEIN: 7.2 G/DL (ref 6.4–8.3)
UROBILINOGEN, POC: 2
WBC # BLD: 5.2 K/UL (ref 3.5–11.3)
WBC # BLD: ABNORMAL 10*3/UL

## 2019-09-27 PROCEDURE — 85025 COMPLETE CBC W/AUTO DIFF WBC: CPT

## 2019-09-27 PROCEDURE — 87491 CHLMYD TRACH DNA AMP PROBE: CPT

## 2019-09-27 PROCEDURE — 80053 COMPREHEN METABOLIC PANEL: CPT

## 2019-09-27 PROCEDURE — 81003 URINALYSIS AUTO W/O SCOPE: CPT

## 2019-09-27 PROCEDURE — 87591 N.GONORRHOEAE DNA AMP PROB: CPT

## 2019-09-27 PROCEDURE — 36415 COLL VENOUS BLD VENIPUNCTURE: CPT

## 2019-09-27 PROCEDURE — 83690 ASSAY OF LIPASE: CPT

## 2019-09-27 RX ORDER — FAMOTIDINE 20 MG/1
20 TABLET, FILM COATED ORAL 2 TIMES DAILY
Qty: 60 TABLET | Refills: 0 | Status: SHIPPED | OUTPATIENT
Start: 2019-09-27 | End: 2020-01-23 | Stop reason: SDUPTHER

## 2019-09-27 NOTE — ED NOTES
Pt arrived to ED with c/o abdominal pain and \"feeling sick\". Pt denies nausea, vomiting, fever. Pt states regular bowel movements. Pt states an incident where he had blood in his urine. Pt denies chest pain. Pt denies shortness of breath. 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 Bilateral lung sounds clear. Respirations non labored. Skin warm and dry. Skin color appropriate to race. Pt A&Ox4.       Ian Chairez RN  09/27/19 1048

## 2019-09-27 NOTE — ED PROVIDER NOTES
EMERGENCY DEPARTMENT ENCOUNTER    Pt Name: Henry Fierro  MRN: [id number]  [place] 1970  Date of evaluation: 9/26/19  CHIEF COMPLAINT       Chief Complaint   Patient presents with    Abdominal Pain     HISTORY OF PRESENT ILLNESS   Presents with intermittent epigastric and LUQ pain for the past 2 days. Unsure if related to breathing in fiberglass at work or decreased oral intake while at work. Admits to daily Ibuprofen use for chronic back pain. Denies EtOH use. The history is provided by the patient. Abdominal Pain   Pain location:  Epigastric and LUQ  Pain quality: aching    Pain radiates to:  Does not radiate  Pain severity:  Mild  Onset quality:  Unable to specify  Duration:  2 days  Timing:  Intermittent  Progression:  Unchanged  Chronicity:  New  Relieved by:  Nothing  Worsened by:  Nothing  Ineffective treatments:  None tried  Associated symptoms: no chest pain, no chills, no constipation, no diarrhea, no dysuria, no fever, no nausea, no shortness of breath and no vomiting    Risk factors: NSAID use        REVIEW OF SYSTEMS     Review of Systems   Constitutional: Negative for chills and fever. Respiratory: Negative for shortness of breath. Cardiovascular: Negative for chest pain. Gastrointestinal: Positive for abdominal pain. Negative for constipation, diarrhea, nausea and vomiting. Genitourinary: Negative for dysuria. PASTMEDICAL HISTORY     Past Medical History:   Diagnosis Date    Asthma     Chronic back pain     Headache(784.0)     HLD (hyperlipidemia) 11/6/2013    Hypertension 7/10/2014    Obesity 10/10/2013    Substance abuse ([facility] [address]     Type II or unspecified type diabetes mellitus without mention of complication, not stated as uncontrolled      SURGICAL HISTORY     History reviewed. No pertinent surgical history.   CURRENT MEDICATIONS       Previous Medications    ASPIRIN LOW DOSE 81 MG EC TABLET    TAKE 1 TABLET BY MOUTH DAILY    ATORVASTATIN (LIPITOR) 20 MG TABLET The patient was given the following medications while in the emergency department:  Orders Placed This Encounter   Medications    famotidine (PEPCID) 20 MG tablet     Sig: Take 1 tablet by mouth 2 times daily     Dispense:  60 tablet     Refill:  0     CONSULTS:  None    FINAL IMPRESSION      1. Abdominal pain, epigastric    2.  Abdominal pain, left upper quadrant          DISPOSITION/PLAN   DISPOSITION Decision To Discharge 09/27/2019 01:03:30 AM      PATIENT REFERRED TO:  Vaishnavi Hernandez MD  [place] [address]. [facility] [address] [facility] [address] [place] [id number] [phone]    In 3 days      Unique Arvizu MD  [address] [facility] [address] [facility]  [phone]    In 1 week  Gastroenterologist    DISCHARGE MEDICATIONS:  New Prescriptions    FAMOTIDINE (PEPCID) 20 MG TABLET    Take 1 tablet by mouth 2 times daily     Antonina Cotton MD  Attending Emergency Physician                    Rafaela Centeno MD  09/27/19 2637

## 2019-10-01 ENCOUNTER — OFFICE VISIT (OUTPATIENT)
Dept: INTERNAL MEDICINE | Age: 49
End: 2019-10-01
Payer: COMMERCIAL

## 2019-10-01 ENCOUNTER — HOSPITAL ENCOUNTER (OUTPATIENT)
Age: 49
Setting detail: SPECIMEN
Discharge: HOME | End: 2019-10-01
Payer: COMMERCIAL

## 2019-10-01 VITALS
WEIGHT: 220 LBS | DIASTOLIC BLOOD PRESSURE: 78 MMHG | SYSTOLIC BLOOD PRESSURE: 142 MMHG | HEART RATE: 53 BPM | BODY MASS INDEX: 28.25 KG/M2

## 2019-10-01 DIAGNOSIS — Z11.3 SCREEN FOR STD (SEXUALLY TRANSMITTED DISEASE): ICD-10-CM

## 2019-10-01 DIAGNOSIS — I10 ESSENTIAL HYPERTENSION: Primary | ICD-10-CM

## 2019-10-01 DIAGNOSIS — R20.0 NUMBNESS OF TOES: ICD-10-CM

## 2019-10-01 DIAGNOSIS — J45.20 MILD INTERMITTENT ASTHMA WITHOUT COMPLICATION: ICD-10-CM

## 2019-10-01 DIAGNOSIS — R82.90 ABNORMAL URINE: Primary | ICD-10-CM

## 2019-10-01 DIAGNOSIS — M54.50 CHRONIC BILATERAL LOW BACK PAIN WITHOUT SCIATICA: ICD-10-CM

## 2019-10-01 DIAGNOSIS — B35.3 TINEA PEDIS OF BOTH FEET: ICD-10-CM

## 2019-10-01 DIAGNOSIS — E78.2 MIXED HYPERLIPIDEMIA: ICD-10-CM

## 2019-10-01 DIAGNOSIS — G89.29 CHRONIC BILATERAL LOW BACK PAIN WITHOUT SCIATICA: ICD-10-CM

## 2019-10-01 DIAGNOSIS — E53.8 LOW FOLATE: Primary | ICD-10-CM

## 2019-10-01 DIAGNOSIS — L84 CALLUS OF FOOT: ICD-10-CM

## 2019-10-01 DIAGNOSIS — R73.02 IGT (IMPAIRED GLUCOSE TOLERANCE): ICD-10-CM

## 2019-10-01 LAB
-: ABNORMAL
AMORPHOUS: ABNORMAL
BACTERIA: ABNORMAL
BILIRUBIN URINE: NEGATIVE
CASTS UA: ABNORMAL /LPF (ref 0–2)
COLOR: YELLOW
CRYSTALS, UA: ABNORMAL /HPF
EPITHELIAL CELLS UA: ABNORMAL /HPF (ref 0–5)
ESTIMATED AVERAGE GLUCOSE: 117 MG/DL
FOLATE: 3.6 NG/ML
GLUCOSE URINE: NEGATIVE
HBA1C MFR BLD: 5.7 % (ref 4–6)
KETONES, URINE: NEGATIVE
LEUKOCYTE ESTERASE, URINE: ABNORMAL
MUCUS: ABNORMAL
NITRITE, URINE: NEGATIVE
OTHER OBSERVATIONS UA: ABNORMAL
PH UA: 6.5 (ref 5–8)
PROTEIN UA: NEGATIVE
RBC UA: ABNORMAL /HPF (ref 0–2)
RENAL EPITHELIAL, UA: ABNORMAL /HPF
SPECIFIC GRAVITY UA: 1.01 (ref 1–1.03)
TRICHOMONAS: ABNORMAL
TSH SERPL DL<=0.05 MIU/L-ACNC: 2.44 MIU/L (ref 0.3–5)
TURBIDITY: CLEAR
URINE HGB: NEGATIVE
UROBILINOGEN, URINE: NORMAL
VITAMIN B-12: 314 PG/ML (ref 232–1245)
WBC UA: ABNORMAL /HPF (ref 0–5)
YEAST: ABNORMAL

## 2019-10-01 PROCEDURE — 84443 ASSAY THYROID STIM HORMONE: CPT

## 2019-10-01 PROCEDURE — 82607 VITAMIN B-12: CPT

## 2019-10-01 PROCEDURE — G8417 CALC BMI ABV UP PARAM F/U: HCPCS | Performed by: INTERNAL MEDICINE

## 2019-10-01 PROCEDURE — 36415 COLL VENOUS BLD VENIPUNCTURE: CPT

## 2019-10-01 PROCEDURE — G8484 FLU IMMUNIZE NO ADMIN: HCPCS | Performed by: INTERNAL MEDICINE

## 2019-10-01 PROCEDURE — 87086 URINE CULTURE/COLONY COUNT: CPT

## 2019-10-01 PROCEDURE — G8427 DOCREV CUR MEDS BY ELIG CLIN: HCPCS | Performed by: INTERNAL MEDICINE

## 2019-10-01 PROCEDURE — 82746 ASSAY OF FOLIC ACID SERUM: CPT

## 2019-10-01 PROCEDURE — 83036 HEMOGLOBIN GLYCOSYLATED A1C: CPT

## 2019-10-01 PROCEDURE — 99211 OFF/OP EST MAY X REQ PHY/QHP: CPT | Performed by: INTERNAL MEDICINE

## 2019-10-01 PROCEDURE — 81001 URINALYSIS AUTO W/SCOPE: CPT

## 2019-10-01 PROCEDURE — 4004F PT TOBACCO SCREEN RCVD TLK: CPT | Performed by: INTERNAL MEDICINE

## 2019-10-01 PROCEDURE — 99213 OFFICE O/P EST LOW 20 MIN: CPT | Performed by: INTERNAL MEDICINE

## 2019-10-01 RX ORDER — ATORVASTATIN CALCIUM 20 MG/1
TABLET, FILM COATED ORAL
Qty: 30 TABLET | Refills: 11 | Status: SHIPPED | OUTPATIENT
Start: 2019-10-01 | End: 2020-10-20

## 2019-10-01 RX ORDER — FOLIC ACID 1 MG/1
1 TABLET ORAL DAILY
Qty: 90 TABLET | Refills: 1 | Status: SHIPPED | OUTPATIENT
Start: 2019-10-01 | End: 2020-01-23 | Stop reason: SDUPTHER

## 2019-10-01 RX ORDER — LISINOPRIL 10 MG/1
TABLET ORAL
Qty: 90 TABLET | Refills: 1 | Status: SHIPPED | OUTPATIENT
Start: 2019-10-01 | End: 2020-01-23 | Stop reason: SDUPTHER

## 2019-10-01 RX ORDER — CLOTRIMAZOLE 1 %
CREAM (GRAM) TOPICAL
Qty: 30 G | Refills: 1 | Status: SHIPPED | OUTPATIENT
Start: 2019-10-01 | End: 2019-10-08

## 2019-10-01 RX ORDER — AMOXICILLIN 250 MG
1 CAPSULE ORAL DAILY PRN
Qty: 30 TABLET | Refills: 0 | Status: SHIPPED | OUTPATIENT
Start: 2019-10-01 | End: 2020-01-23 | Stop reason: ALTCHOICE

## 2019-10-01 ASSESSMENT — ENCOUNTER SYMPTOMS
DIARRHEA: 0
BACK PAIN: 1
SINUS PAIN: 0
RHINORRHEA: 0
BLOOD IN STOOL: 0
COUGH: 0
ABDOMINAL PAIN: 0
PHOTOPHOBIA: 0
CONSTIPATION: 1
WHEEZING: 0
SHORTNESS OF BREATH: 0
SINUS PRESSURE: 0
NAUSEA: 0

## 2019-10-02 LAB
CULTURE: NO GROWTH
CULTURE: NO GROWTH
Lab: NORMAL
Lab: NORMAL
SPECIMEN DESCRIPTION: NORMAL
SPECIMEN DESCRIPTION: NORMAL

## 2019-11-25 ENCOUNTER — TELEPHONE (OUTPATIENT)
Dept: INTERNAL MEDICINE | Age: 49
End: 2019-11-25

## 2020-01-23 ENCOUNTER — OFFICE VISIT (OUTPATIENT)
Dept: INTERNAL MEDICINE | Age: 50
End: 2020-01-23
Payer: COMMERCIAL

## 2020-01-23 VITALS
BODY MASS INDEX: 28.89 KG/M2 | SYSTOLIC BLOOD PRESSURE: 122 MMHG | HEART RATE: 67 BPM | DIASTOLIC BLOOD PRESSURE: 77 MMHG | WEIGHT: 225 LBS

## 2020-01-23 PROCEDURE — 4004F PT TOBACCO SCREEN RCVD TLK: CPT | Performed by: INTERNAL MEDICINE

## 2020-01-23 PROCEDURE — G8427 DOCREV CUR MEDS BY ELIG CLIN: HCPCS | Performed by: INTERNAL MEDICINE

## 2020-01-23 PROCEDURE — G8417 CALC BMI ABV UP PARAM F/U: HCPCS | Performed by: INTERNAL MEDICINE

## 2020-01-23 PROCEDURE — 99213 OFFICE O/P EST LOW 20 MIN: CPT | Performed by: INTERNAL MEDICINE

## 2020-01-23 PROCEDURE — G8484 FLU IMMUNIZE NO ADMIN: HCPCS | Performed by: INTERNAL MEDICINE

## 2020-01-23 PROCEDURE — 99211 OFF/OP EST MAY X REQ PHY/QHP: CPT | Performed by: INTERNAL MEDICINE

## 2020-01-23 RX ORDER — FOLIC ACID 1 MG/1
1 TABLET ORAL DAILY
Qty: 90 TABLET | Refills: 1 | Status: SHIPPED | OUTPATIENT
Start: 2020-01-23 | End: 2020-11-27

## 2020-01-23 RX ORDER — FAMOTIDINE 20 MG/1
20 TABLET, FILM COATED ORAL 2 TIMES DAILY
Qty: 60 TABLET | Refills: 0 | Status: SHIPPED | OUTPATIENT
Start: 2020-01-23 | End: 2021-01-12 | Stop reason: SDUPTHER

## 2020-01-23 RX ORDER — OXYCODONE AND ACETAMINOPHEN 10; 325 MG/1; MG/1
1 TABLET ORAL 2 TIMES DAILY
COMMUNITY
Start: 2020-01-16 | End: 2021-05-25 | Stop reason: ALTCHOICE

## 2020-01-23 RX ORDER — LISINOPRIL 10 MG/1
TABLET ORAL
Qty: 90 TABLET | Refills: 1 | Status: SHIPPED | OUTPATIENT
Start: 2020-01-23 | End: 2020-04-14

## 2020-01-23 RX ORDER — LANOLIN ALCOHOL/MO/W.PET/CERES
1000 CREAM (GRAM) TOPICAL DAILY
Qty: 30 TABLET | Refills: 3 | Status: SHIPPED | OUTPATIENT
Start: 2020-01-23 | End: 2020-05-12

## 2020-01-23 ASSESSMENT — ENCOUNTER SYMPTOMS
WHEEZING: 0
CHEST TIGHTNESS: 0
ABDOMINAL PAIN: 0
COUGH: 0
BACK PAIN: 1
EYE REDNESS: 0
CONSTIPATION: 0
NAUSEA: 0
DIARRHEA: 0
SHORTNESS OF BREATH: 0

## 2020-01-23 ASSESSMENT — PATIENT HEALTH QUESTIONNAIRE - PHQ9
SUM OF ALL RESPONSES TO PHQ9 QUESTIONS 1 & 2: 0
2. FEELING DOWN, DEPRESSED OR HOPELESS: 0
1. LITTLE INTEREST OR PLEASURE IN DOING THINGS: 0
SUM OF ALL RESPONSES TO PHQ QUESTIONS 1-9: 0
SUM OF ALL RESPONSES TO PHQ QUESTIONS 1-9: 0

## 2020-01-23 NOTE — PROGRESS NOTES
state.   Neurological: Positive for numbness. Negative for dizziness, weakness and headaches. Hematological: Negative for adenopathy. Does not bruise/bleed easily. Psychiatric/Behavioral: Negative for dysphoric mood. The patient is not nervous/anxious. PHYSICAL EXAM:     Vitals:    01/23/20 0908 01/23/20 0939   BP: (!) 139/91 122/77   Site: Right Upper Arm Right Upper Arm   Position: Sitting Sitting   Cuff Size: Large Adult Large Adult   Pulse: 63 67   Weight: 225 lb (102.1 kg)      Body mass index is 28.89 kg/m². BP Readings from Last 3 Encounters:   01/23/20 122/77   10/01/19 (!) 142/78   09/26/19 (!) 154/87        Wt Readings from Last 3 Encounters:   01/23/20 225 lb (102.1 kg)   10/01/19 220 lb (99.8 kg)   09/26/19 216 lb 8 oz (98.2 kg)       Physical Exam  Vitals signs and nursing note reviewed. Constitutional:       Appearance: Normal appearance. HENT:      Head: Normocephalic and atraumatic. Mouth/Throat:      Mouth: Mucous membranes are moist.      Pharynx: Oropharynx is clear. Eyes:      Extraocular Movements: Extraocular movements intact. Conjunctiva/sclera: Conjunctivae normal.      Pupils: Pupils are equal, round, and reactive to light. Cardiovascular:      Rate and Rhythm: Normal rate and regular rhythm. Heart sounds: No murmur. Pulmonary:      Effort: Pulmonary effort is normal.      Breath sounds: Normal breath sounds. No wheezing or rales. Musculoskeletal:      Right lower leg: No edema. Left lower leg: No edema. Skin:     General: Skin is warm and dry. Neurological:      General: No focal deficit present. Mental Status: He is alert and oriented to person, place, and time.                LABORATORY FINDINGS:    CBC:  Lab Results   Component Value Date    WBC 5.2 09/27/2019    HGB 14.1 09/27/2019     09/27/2019     BMP:    Lab Results   Component Value Date     09/27/2019    K 3.6 09/27/2019     09/27/2019    CO2 27 09/27/2019 BUN 9 09/27/2019    CREATININE 0.99 09/27/2019    GLUCOSE 110 09/27/2019     HEMOGLOBIN A1C:   Lab Results   Component Value Date    LABA1C 5.7 10/01/2019     MICROALBUMIN URINE:   Lab Results   Component Value Date    MICROALBUR <12 04/08/2016     FASTING LIPID [email or web address]  Lab Results   Component Value Date    LDLCHOLESTEROL 112 03/08/2019       LIVER PROFILE:  Lab Results   Component Value Date    ALT 9 09/27/2019    AST 15 09/27/2019    PROT 7.2 09/27/2019    BILITOT 0.50 09/27/2019    BILIDIR <0.08 07/02/2015    LABALBU 4.4 09/27/2019      THYROID FUNCTION:   Lab Results   Component Value Date    TSH 2.44 10/01/2019      URINEANALYSIS: No results found for: LABURIN  ASSESSMENT AND PLAN:    1. Essential hypertension    - lisinopril (PRINIVIL;ZESTRIL) 10 MG tablet; TAKE 1 TABLET BY MOUTH DAILY  Dispense: 90 tablet; Refill: 1    2. IGT (impaired glucose tolerance)  Monitor yearly      3. Mixed hyperlipidemia  On statins     4. Mild intermittent asthma without complication  Uncomplicated   Refuses flu vaccine    5. Low folate    - folic acid (FOLVITE) 1 MG tablet; Take 1 tablet by mouth daily  Dispense: 90 tablet; Refill: 1  - vitamin B-12 (CYANOCOBALAMIN) 1000 MCG tablet; Take 1 tablet by mouth daily  Dispense: 30 tablet; Refill: 3    6. Marijuana use  Counseled      7. Smoker  Not motivated to quit          FOLLOW UP AND INSTRUCTIONS:   Return in about 6 months (around 7/23/2020). 1. Shante Scott received counseling on the following healthy behaviors: nutrition, exercise, medication adherence and tobacco cessation    2. Reviewed prior labs and health maintenance. 3. Discussed use, benefit, and side effects of prescribed medications. Barriers to medication compliance addressed. All patient questions answered. Pt voiced understanding.      4. Patient given educational materials - see patient instructions    Rodolfo Mullins  Attending Physician, [facility]

## 2020-04-22 ENCOUNTER — HOSPITAL ENCOUNTER (EMERGENCY)
Age: 50
Discharge: HOME | End: 2020-04-22
Attending: EMERGENCY MEDICINE
Payer: COMMERCIAL

## 2020-04-22 VITALS
WEIGHT: 227 LBS | DIASTOLIC BLOOD PRESSURE: 90 MMHG | SYSTOLIC BLOOD PRESSURE: 155 MMHG | RESPIRATION RATE: 16 BRPM | OXYGEN SATURATION: 100 % | HEART RATE: 43 BPM | HEIGHT: 74 IN | TEMPERATURE: 98.4 F | BODY MASS INDEX: 29.13 KG/M2

## 2020-04-22 PROCEDURE — 6360000002 HC RX W HCPCS: Performed by: NURSE PRACTITIONER

## 2020-04-22 PROCEDURE — 99284 EMERGENCY DEPT VISIT MOD MDM: CPT

## 2020-04-22 PROCEDURE — 2580000003 HC RX 258: Performed by: NURSE PRACTITIONER

## 2020-04-22 PROCEDURE — 96374 THER/PROPH/DIAG INJ IV PUSH: CPT

## 2020-04-22 PROCEDURE — 96375 TX/PRO/DX INJ NEW DRUG ADDON: CPT

## 2020-04-22 RX ORDER — DIPHENHYDRAMINE HYDROCHLORIDE 50 MG/ML
25 INJECTION INTRAMUSCULAR; INTRAVENOUS ONCE
Status: COMPLETED | OUTPATIENT
Start: 2020-04-22 | End: 2020-04-22

## 2020-04-22 RX ORDER — BUTALBITAL, ACETAMINOPHEN AND CAFFEINE 50; 325; 40 MG/1; MG/1; MG/1
1 TABLET ORAL EVERY 6 HOURS PRN
Qty: 180 TABLET | Refills: 0 | Status: SHIPPED | OUTPATIENT
Start: 2020-04-22 | End: 2021-05-25 | Stop reason: SDUPTHER

## 2020-04-22 RX ORDER — PROMETHAZINE HYDROCHLORIDE 25 MG/ML
12.5 INJECTION, SOLUTION INTRAMUSCULAR; INTRAVENOUS ONCE
Status: COMPLETED | OUTPATIENT
Start: 2020-04-22 | End: 2020-04-22

## 2020-04-22 RX ORDER — KETOROLAC TROMETHAMINE 30 MG/ML
30 INJECTION, SOLUTION INTRAMUSCULAR; INTRAVENOUS ONCE
Status: COMPLETED | OUTPATIENT
Start: 2020-04-22 | End: 2020-04-22

## 2020-04-22 RX ORDER — 0.9 % SODIUM CHLORIDE 0.9 %
1000 INTRAVENOUS SOLUTION INTRAVENOUS ONCE
Status: COMPLETED | OUTPATIENT
Start: 2020-04-22 | End: 2020-04-22

## 2020-04-22 RX ADMIN — PROMETHAZINE HYDROCHLORIDE 12.5 MG: 25 INJECTION INTRAMUSCULAR; INTRAVENOUS at 19:01

## 2020-04-22 RX ADMIN — KETOROLAC TROMETHAMINE 30 MG: 30 INJECTION, SOLUTION INTRAMUSCULAR at 19:01

## 2020-04-22 RX ADMIN — DIPHENHYDRAMINE HYDROCHLORIDE 25 MG: 50 INJECTION INTRAMUSCULAR; INTRAVENOUS at 19:00

## 2020-04-22 RX ADMIN — SODIUM CHLORIDE 1000 ML: 9 INJECTION, SOLUTION INTRAVENOUS at 19:02

## 2020-04-22 ASSESSMENT — PAIN DESCRIPTION - PROGRESSION: CLINICAL_PROGRESSION: GRADUALLY WORSENING

## 2020-04-22 ASSESSMENT — PAIN DESCRIPTION - FREQUENCY: FREQUENCY: CONTINUOUS

## 2020-04-22 ASSESSMENT — PAIN SCALES - GENERAL
PAINLEVEL_OUTOF10: 10
PAINLEVEL_OUTOF10: 10
PAINLEVEL_OUTOF10: 4

## 2020-04-22 ASSESSMENT — ENCOUNTER SYMPTOMS
RHINORRHEA: 0
WHEEZING: 0
SHORTNESS OF BREATH: 0
COUGH: 0
VOMITING: 0
ABDOMINAL PAIN: 0
SINUS PRESSURE: 0
CONSTIPATION: 0
DIARRHEA: 0
NAUSEA: 0
SORE THROAT: 0
COLOR CHANGE: 0

## 2020-04-22 ASSESSMENT — PAIN DESCRIPTION - PAIN TYPE: TYPE: ACUTE PAIN

## 2020-04-22 ASSESSMENT — PAIN DESCRIPTION - DESCRIPTORS: DESCRIPTORS: ACHING

## 2020-04-22 ASSESSMENT — PAIN DESCRIPTION - LOCATION: LOCATION: HEAD

## 2020-04-22 ASSESSMENT — PAIN DESCRIPTION - ONSET: ONSET: ON-GOING

## 2020-04-22 NOTE — ED PROVIDER NOTES
eMERGENCY dEPARTMENT eNCOUnter   Independent Attestation     Pt Name: Naveen Amaral  MRN: [id number]  [place] 1970  Date of evaluation: 4/22/20     Naveen Amaral is a 53 y.o. male with CC: Headache      Based on the medical record the care appears appropriate. I was personally available for consultation in the Emergency Department.     Rosamaria Watt MD  Attending Emergency Physician                    Rosamaria Watt MD  04/22/20 8875

## 2020-04-22 NOTE — ED NOTES
Ambulated to room. Has a hx of migraines that usually go away with OTC pain meds. This one has been present a few days. No nausea/vom. A+Ox4. Skin is warm, dry, and intact. RR even and unlabored.       Dunia Escobedo RN  04/22/20 4284

## 2020-04-23 ENCOUNTER — CARE COORDINATION (OUTPATIENT)
Dept: CARE COORDINATION | Age: 50
End: 2020-04-23

## 2020-04-23 ENCOUNTER — TELEPHONE (OUTPATIENT)
Dept: ORTHOPEDIC SURGERY | Age: 50
End: 2020-04-23

## 2020-04-23 NOTE — ED PROVIDER NOTES
[address] [place] [address] ED  eMERGENCY dEPARTMENT eNCOUnter      Pt Name: Placido Kapoor  MRN: [id number]  [place] 1970  Date of evaluation: 4/22/2020  Provider: Pawan Gaffney NP, JAD[facility] [address]       Chief Complaint   Patient presents with    Headache         HISTORY OF PRESENT ILLNESS  (Location/Symptom, Timing/Onset, Context/Setting, Quality, Duration, Modifying Factors, Severity.)   Placido Kapoor is a 53 y.o. male who presents to the emergency department private vehicle for evaluation of headache. Patient states that he has a throbbing generalized headache. States been gone for the last 2 days. He rates the headache an 8 on a 0-to-10 scale. He states normally he can handle these headaches at home with some Motrin. He states he has been taking Motrin without any improvement of the headache. He denies any vomiting or diarrhea. He denies that this is the worst headache of his life. He is not experiencing fevers or chills. He states that he has had work-up for headaches in the past.      Nursing Notes were reviewed. ALLERGIES     Patient has no known allergies. CURRENT MEDICATIONS       Discharge Medication List as of 4/22/2020  7:56 PM      CONTINUE these medications which have NOT CHANGED    Details   lisinopril (PRINIVIL;ZESTRIL) 10 MG tablet TAKE 1 TABLET BY MOUTH DAILY, Disp-90 tablet, R-0This prescription was filled on 3/26/2020. Any refills authorized will be placed on file. Normal      oxyCODONE-acetaminophen (PERCOCET)  MG per tablet Take 1 tablet by mouth 2 times daily. Historical Med      folic acid (FOLVITE) 1 MG tablet Take 1 tablet by mouth daily, Disp-90 tablet, R-1Normal      famotidine (PEPCID) 20 MG tablet Take 1 tablet by mouth 2 times daily, Disp-60 tablet, R-0Normal      vitamin B-12 (CYANOCOBALAMIN) 1000 MCG tablet Take 1 tablet by mouth daily, Disp-30 tablet, R-3Normal      atorvastatin (LIPITOR) 20 MG tablet take 1 tablet by mouth once daily, Disp-30 tablet, R-11Normal      ASPIRIN LOW DOSE 81 MG EC tablet TAKE 1 TABLET BY MOUTH DAILY, Disp-30 tablet, R-11Normal      ibuprofen (ADVIL;MOTRIN) 800 MG tablet Take 800 mg by mouth daily as needed for PainHistorical Med      VENTOLIN  (90 BASE) MCG/ACT inhaler INHALE 2 PUFFS EVERY SIX HOURS AS NEEDED FOR WHEEZING, Disp-18 g, R-11             PAST MEDICAL HISTORY         Diagnosis Date    Asthma     Chronic back pain     Headache(784.0)     HLD (hyperlipidemia) 2013    Hypertension 7/10/2014    Obesity 10/10/2013    [facility] [address]     Type II or unspecified type diabetes mellitus without mention of complication, not stated as uncontrolled        SURGICAL HISTORY     History reviewed. No pertinent surgical history. FAMILY HISTORY           Problem Relation Age of Onset    Cancer Mother         lung    Other Mother         sickle cell anemia     Family Status   Relation Name Status    Mother      Father  Alive    Sister  Alive    Brother  Alive        SOCIAL HISTORY      reports that he has been smoking cigars. He has a 10.00 pack-year smoking history. He has never used smokeless tobacco. He reports current alcohol use. He reports current drug use. Frequency: 7.00 times per week. Drug: Marijuana. REVIEW OF SYSTEMS    (2-9 systems for level 4, 10 or more for level 5)     Review of Systems   Constitutional: Negative for chills, fever and unexpected weight change. HENT: Negative for congestion, rhinorrhea, sinus pressure and sore throat. Respiratory: Negative for cough, shortness of breath and wheezing. Cardiovascular: Negative for chest pain and palpitations. Gastrointestinal: Negative for abdominal pain, constipation, diarrhea, nausea and vomiting. Genitourinary: Negative for dysuria and hematuria. Musculoskeletal: Negative for arthralgias and myalgias. Skin: Negative for color change and rash. Neurological: Positive for headaches.  Negative for

## 2020-05-12 RX ORDER — LANOLIN ALCOHOL/MO/W.PET/CERES
1000 CREAM (GRAM) TOPICAL DAILY
Qty: 30 TABLET | Refills: 3 | Status: SHIPPED | OUTPATIENT
Start: 2020-05-12 | End: 2021-01-12 | Stop reason: SDUPTHER

## 2020-07-07 ENCOUNTER — HOSPITAL ENCOUNTER (EMERGENCY)
Age: 50
Discharge: HOME | End: 2020-07-07
Attending: EMERGENCY MEDICINE
Payer: COMMERCIAL

## 2020-07-07 VITALS
RESPIRATION RATE: 16 BRPM | HEART RATE: 89 BPM | SYSTOLIC BLOOD PRESSURE: 152 MMHG | OXYGEN SATURATION: 98 % | TEMPERATURE: 98.2 F | WEIGHT: 219.8 LBS | HEIGHT: 74 IN | BODY MASS INDEX: 28.21 KG/M2 | DIASTOLIC BLOOD PRESSURE: 84 MMHG

## 2020-07-07 PROCEDURE — 90471 IMMUNIZATION ADMIN: CPT | Performed by: PHYSICIAN ASSISTANT

## 2020-07-07 PROCEDURE — 2500000003 HC RX 250 WO HCPCS: Performed by: PHYSICIAN ASSISTANT

## 2020-07-07 PROCEDURE — 99282 EMERGENCY DEPT VISIT SF MDM: CPT

## 2020-07-07 PROCEDURE — 6360000002 HC RX W HCPCS: Performed by: PHYSICIAN ASSISTANT

## 2020-07-07 PROCEDURE — 90715 TDAP VACCINE 7 YRS/> IM: CPT | Performed by: PHYSICIAN ASSISTANT

## 2020-07-07 RX ADMIN — TETANUS TOXOID, REDUCED DIPHTHERIA TOXOID AND ACELLULAR PERTUSSIS VACCINE, ADSORBED 0.5 ML: 5; 2.5; 8; 8; 2.5 SUSPENSION INTRAMUSCULAR at 10:24

## 2020-07-07 RX ADMIN — SILVER SULFADIAZINE: 10 CREAM TOPICAL at 10:24

## 2020-07-07 NOTE — ED NOTES
Patient comes in from home and presents with large area on the left upper that has redness and a layer of skin peeled away. Patient states that he was boiling water on 7/3 and burned his arm. Patient is alert and oriented and denies any other complaints at this time. Patient walks to room.        Michelle Ramos RN  07/07/20 7939
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
## 2020-07-14 ENCOUNTER — OFFICE VISIT (OUTPATIENT)
Dept: BURN CARE | Age: 50
End: 2020-07-14
Payer: COMMERCIAL

## 2020-07-14 VITALS
HEART RATE: 60 BPM | WEIGHT: 223.2 LBS | BODY MASS INDEX: 28.64 KG/M2 | HEIGHT: 74 IN | DIASTOLIC BLOOD PRESSURE: 82 MMHG | SYSTOLIC BLOOD PRESSURE: 122 MMHG

## 2020-07-14 PROCEDURE — 4004F PT TOBACCO SCREEN RCVD TLK: CPT | Performed by: PLASTIC SURGERY

## 2020-07-14 PROCEDURE — G8417 CALC BMI ABV UP PARAM F/U: HCPCS | Performed by: PLASTIC SURGERY

## 2020-07-14 PROCEDURE — 99202 OFFICE O/P NEW SF 15 MIN: CPT

## 2020-07-14 PROCEDURE — 99202 OFFICE O/P NEW SF 15 MIN: CPT | Performed by: PLASTIC SURGERY

## 2020-07-14 PROCEDURE — G8427 DOCREV CUR MEDS BY ELIG CLIN: HCPCS | Performed by: PLASTIC SURGERY
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
## 2020-10-29 ENCOUNTER — HOSPITAL ENCOUNTER (EMERGENCY)
Age: 50
Discharge: HOME | End: 2020-10-29
Attending: EMERGENCY MEDICINE
Payer: COMMERCIAL

## 2020-10-29 ENCOUNTER — APPOINTMENT (OUTPATIENT)
Dept: CT IMAGING | Age: 50
End: 2020-10-29
Payer: COMMERCIAL

## 2020-10-29 ENCOUNTER — APPOINTMENT (OUTPATIENT)
Dept: GENERAL RADIOLOGY | Age: 50
End: 2020-10-29
Payer: COMMERCIAL

## 2020-10-29 VITALS
RESPIRATION RATE: 16 BRPM | BODY MASS INDEX: 28.36 KG/M2 | OXYGEN SATURATION: 99 % | SYSTOLIC BLOOD PRESSURE: 150 MMHG | DIASTOLIC BLOOD PRESSURE: 88 MMHG | HEART RATE: 59 BPM | HEIGHT: 74 IN | WEIGHT: 221 LBS | TEMPERATURE: 98.7 F

## 2020-10-29 LAB
-: NORMAL
ABSOLUTE EOS #: 0.27 K/UL (ref 0–0.44)
ABSOLUTE IMMATURE GRANULOCYTE: 0.01 K/UL (ref 0–0.3)
ABSOLUTE LYMPH #: 2.53 K/UL (ref 1.1–3.7)
ABSOLUTE MONO #: 0.44 K/UL (ref 0.1–1.2)
ALBUMIN SERPL-MCNC: 4.2 G/DL (ref 3.5–5.2)
ALBUMIN/GLOBULIN RATIO: NORMAL (ref 1–2.5)
ALP BLD-CCNC: 64 U/L (ref 40–129)
ALT SERPL-CCNC: 15 U/L (ref 5–41)
AMORPHOUS: NORMAL
ANION GAP SERPL CALCULATED.3IONS-SCNC: 10 MMOL/L (ref 9–17)
AST SERPL-CCNC: 15 U/L
BACTERIA: NORMAL
BASOPHILS # BLD: 0 % (ref 0–2)
BASOPHILS ABSOLUTE: <0.03 K/UL (ref 0–0.2)
BILIRUB SERPL-MCNC: 0.46 MG/DL (ref 0.3–1.2)
BILIRUBIN URINE: NEGATIVE
BUN BLDV-MCNC: 12 MG/DL (ref 6–20)
BUN/CREAT BLD: 15 (ref 9–20)
CALCIUM SERPL-MCNC: 9.4 MG/DL (ref 8.6–10.4)
CASTS UA: NORMAL /LPF
CHLORIDE BLD-SCNC: 104 MMOL/L (ref 98–107)
CO2: 25 MMOL/L (ref 20–31)
COLOR: YELLOW
COMMENT UA: ABNORMAL
CREAT SERPL-MCNC: 0.79 MG/DL (ref 0.7–1.2)
CRYSTALS, UA: NORMAL /HPF
DIFFERENTIAL TYPE: NORMAL
EOSINOPHILS RELATIVE PERCENT: 4 % (ref 1–4)
EPITHELIAL CELLS UA: NORMAL /HPF (ref 0–5)
GFR AFRICAN AMERICAN: >60 ML/MIN
GFR NON-AFRICAN AMERICAN: >60 ML/MIN
GFR SERPL CREATININE-BSD FRML MDRD: NORMAL ML/MIN/{1.73_M2}
GFR SERPL CREATININE-BSD FRML MDRD: NORMAL ML/MIN/{1.73_M2}
GLUCOSE BLD-MCNC: 92 MG/DL (ref 70–99)
GLUCOSE URINE: NEGATIVE
HCT VFR BLD CALC: 44.4 % (ref 40.7–50.3)
HEMOGLOBIN: 15 G/DL (ref 13–17)
IMMATURE GRANULOCYTES: 0 %
KETONES, URINE: NEGATIVE
LEUKOCYTE ESTERASE, URINE: NEGATIVE
LYMPHOCYTES # BLD: 39 % (ref 24–43)
MCH RBC QN AUTO: 31.1 PG (ref 25.2–33.5)
MCHC RBC AUTO-ENTMCNC: 33.8 G/DL (ref 28.4–34.8)
MCV RBC AUTO: 91.9 FL (ref 82.6–102.9)
MONOCYTES # BLD: 7 % (ref 3–12)
MUCUS: NORMAL
NITRITE, URINE: NEGATIVE
NRBC AUTOMATED: 0 PER 100 WBC
OTHER OBSERVATIONS UA: NORMAL
PDW BLD-RTO: 13.3 % (ref 11.8–14.4)
PH UA: 6 (ref 5–8)
PLATELET # BLD: 215 K/UL (ref 138–453)
PLATELET ESTIMATE: NORMAL
PMV BLD AUTO: 9.4 FL (ref 8.1–13.5)
POTASSIUM SERPL-SCNC: 4 MMOL/L (ref 3.7–5.3)
PROTEIN UA: NEGATIVE
RBC # BLD: 4.83 M/UL (ref 4.21–5.77)
RBC # BLD: NORMAL 10*6/UL
RBC UA: NORMAL /HPF (ref 0–2)
RENAL EPITHELIAL, UA: NORMAL /HPF
SEG NEUTROPHILS: 50 % (ref 36–65)
SEGMENTED NEUTROPHILS ABSOLUTE COUNT: 3.19 K/UL (ref 1.5–8.1)
SODIUM BLD-SCNC: 139 MMOL/L (ref 135–144)
SPECIFIC GRAVITY UA: 1.03 (ref 1–1.03)
TOTAL PROTEIN: 7.1 G/DL (ref 6.4–8.3)
TRICHOMONAS: NORMAL
TROPONIN INTERP: NORMAL
TROPONIN T: NORMAL NG/ML
TROPONIN, HIGH SENSITIVITY: <6 NG/L (ref 0–22)
TURBIDITY: ABNORMAL
URINE HGB: ABNORMAL
UROBILINOGEN, URINE: NORMAL
WBC # BLD: 6.5 K/UL (ref 3.5–11.3)
WBC # BLD: NORMAL 10*3/UL
WBC UA: NORMAL /HPF (ref 0–5)
YEAST: NORMAL

## 2020-10-29 PROCEDURE — 6360000004 HC RX CONTRAST MEDICATION: Performed by: EMERGENCY MEDICINE

## 2020-10-29 PROCEDURE — 85025 COMPLETE CBC W/AUTO DIFF WBC: CPT

## 2020-10-29 PROCEDURE — 80053 COMPREHEN METABOLIC PANEL: CPT

## 2020-10-29 PROCEDURE — 96360 HYDRATION IV INFUSION INIT: CPT

## 2020-10-29 PROCEDURE — 93005 ELECTROCARDIOGRAM TRACING: CPT | Performed by: EMERGENCY MEDICINE

## 2020-10-29 PROCEDURE — 71046 X-RAY EXAM CHEST 2 VIEWS: CPT

## 2020-10-29 PROCEDURE — 2580000003 HC RX 258: Performed by: EMERGENCY MEDICINE

## 2020-10-29 PROCEDURE — 81001 URINALYSIS AUTO W/SCOPE: CPT

## 2020-10-29 PROCEDURE — 99283 EMERGENCY DEPT VISIT LOW MDM: CPT

## 2020-10-29 PROCEDURE — 84484 ASSAY OF TROPONIN QUANT: CPT

## 2020-10-29 PROCEDURE — 74177 CT ABD & PELVIS W/CONTRAST: CPT

## 2020-10-29 PROCEDURE — 81025 URINE PREGNANCY TEST: CPT

## 2020-10-29 RX ORDER — 0.9 % SODIUM CHLORIDE 0.9 %
1000 INTRAVENOUS SOLUTION INTRAVENOUS ONCE
Status: COMPLETED | OUTPATIENT
Start: 2020-10-29 | End: 2020-10-29

## 2020-10-29 RX ORDER — SODIUM CHLORIDE 0.9 % (FLUSH) 0.9 %
10 SYRINGE (ML) INJECTION PRN
Status: DISCONTINUED | OUTPATIENT
Start: 2020-10-29 | End: 2020-10-29 | Stop reason: HOSPADM

## 2020-10-29 RX ORDER — 0.9 % SODIUM CHLORIDE 0.9 %
80 INTRAVENOUS SOLUTION INTRAVENOUS ONCE
Status: COMPLETED | OUTPATIENT
Start: 2020-10-29 | End: 2020-10-29

## 2020-10-29 RX ADMIN — SODIUM CHLORIDE 1000 ML: 9 INJECTION, SOLUTION INTRAVENOUS at 14:01

## 2020-10-29 RX ADMIN — SODIUM CHLORIDE 80 ML: 9 INJECTION, SOLUTION INTRAVENOUS at 14:50

## 2020-10-29 RX ADMIN — Medication 10 ML: at 14:50

## 2020-10-29 RX ADMIN — IOPAMIDOL 75 ML: 755 INJECTION, SOLUTION INTRAVENOUS at 14:50

## 2020-10-29 NOTE — ED PROVIDER NOTES
EMERGENCY DEPARTMENT ENCOUNTER    Pt Name: Lokesh Womack  MRN: [id number]  [place] 1970  Date of evaluation: 10/29/20  CHIEF COMPLAINT       Chief Complaint   Patient presents with    Hematuria    Dizziness     HISTORY OF PRESENT ILLNESS   36-year-old male presents to the emergency room with an episode of dizziness that started this morning when patient awoke. Patient reports when he woke up he felt lightheaded and woozy as if he had been drinking but reports no such activity last night. Patient states that he went back to bed and felt a little bit better but the dizziness recurred again. He did not have any chest pain or difficulty breathing with his symptoms. He reports no recent illness. No recent nausea vomiting diarrhea or difficulty breathing. No back or abdominal pain. He does not report the room is spinning. He has not had any balance issues. He denies any vision change or headache. Patient also reports intermittent hematuria that has been ongoing for the last couple of weeks. Patient reports that he has had this in the past and his doctor told him it was nothing to be worried about. He states that he did not have any type of work-up done in the past.          REVIEW OF SYSTEMS     Review of Systems   Genitourinary: Positive for hematuria. Neurological: Positive for dizziness and light-headedness.        PASTMEDICAL HISTORY     Past Medical History:   Diagnosis Date    Asthma     Chronic back pain     Headache(784.0)     HLD (hyperlipidemia) 11/6/2013    Hypertension 7/10/2014    Obesity 10/10/2013    Substance abuse ([facility] [address]     Type II or unspecified type diabetes mellitus without mention of complication, not stated as uncontrolled      Past Problem List  Patient Active Problem List   Diagnosis Code    Asthma J45.909    Back pain M54.9    Obesity E66.9    Hyperlipidemia E78.5    Hypertension I10    Chronic bilateral low back pain without sciatica M54.5, G89.29    Lumbar disc disease M51.9    Marijuana use F12.90    Low folate E53.8     SURGICAL HISTORY     History reviewed. No pertinent surgical history. CURRENT MEDICATIONS       Discharge Medication List as of 10/29/2020  3:36 PM      CONTINUE these medications which have NOT CHANGED    Details   atorvastatin (LIPITOR) 20 MG tablet TAKE 1 TABLET BY MOUTH DAILY, Disp-30 tablet,R-1PLEASE HAVE PATIENT CONTACT OFFICE FOR FURTHER REFILLSNormal      vitamin B-12 (CYANOCOBALAMIN) 1000 MCG tablet Take 1 tablet by mouth daily, Disp-30 tablet, R-3This prescription was filled on 2020. Any refills authorized will be placed on file. Normal      butalbital-acetaminophen-caffeine (ESGIC) -40 MG per tablet Take 1 tablet by mouth every 6 hours as needed for Headaches, Disp-180 tablet, R-0Print      lisinopril (PRINIVIL;ZESTRIL) 10 MG tablet TAKE 1 TABLET BY MOUTH DAILY, Disp-90 tablet, R-0This prescription was filled on 3/26/2020. Any refills authorized will be placed on file. Normal      oxyCODONE-acetaminophen (PERCOCET)  MG per tablet Take 1 tablet by mouth 2 times daily. Historical Med      folic acid (FOLVITE) 1 MG tablet Take 1 tablet by mouth daily, Disp-90 tablet, R-1Normal      famotidine (PEPCID) 20 MG tablet Take 1 tablet by mouth 2 times daily, Disp-60 tablet, R-0Normal      ASPIRIN LOW DOSE 81 MG EC tablet TAKE 1 TABLET BY MOUTH DAILY, Disp-30 tablet, R-11Normal      ibuprofen (ADVIL;MOTRIN) 800 MG tablet Take 800 mg by mouth daily as needed for PainHistorical Med      VENTOLIN  (90 BASE) MCG/ACT inhaler INHALE 2 PUFFS EVERY SIX HOURS AS NEEDED FOR WHEEZING, Disp-18 g, R-11           ALLERGIES     has No Known Allergies. FAMILY HISTORY     He indicated that his mother is . He indicated that his father is alive. He indicated that his sister is alive. He indicated that his brother is alive.      SOCIAL HISTORY       Social History     Tobacco Use    Smoking status: Current Some Day Smoker     Packs/day: 1.00     Years: 10.00     Pack years: 10.00     Types: Cigars    Smokeless tobacco: Never Used   Substance Use Topics    Alcohol use: Not Currently     Alcohol/week: 0.0 standard drinks     Comment: social rare    Drug use: Yes     Frequency: 7.0 times per week     Types: Marijuana     Comment: every other day     PHYSICAL EXAM     INITIAL VITALS: BP (!) 150/88   Pulse 59   Temp 98.7 °F (37.1 °C) (Oral)   Resp 16   Ht 6' 2\" (1.88 m)   Wt 221 lb (100.2 kg)   SpO2 99%   BMI 28.37 kg/m²    Physical Exam  Constitutional:       General: He is not in acute distress. Appearance: He is well-developed. HENT:      Head: Normocephalic. Eyes:      Pupils: Pupils are equal, round, and reactive to light. Cardiovascular:      Rate and Rhythm: Normal rate and regular rhythm. Heart sounds: Normal heart sounds. Pulmonary:      Effort: Pulmonary effort is normal. No respiratory distress. Breath sounds: Normal breath sounds. Abdominal:      General: Bowel sounds are normal.      Palpations: Abdomen is soft. Tenderness: There is no abdominal tenderness. Musculoskeletal: Normal range of motion. Skin:     General: Skin is warm and dry. Neurological:      Mental Status: He is alert and oriented to person, place, and time. MEDICAL DECISION MAKING:     Nontoxic well-appearing 56-year-old male coming into the emergency room with an episode of dizziness earlier this morning. Dizziness seems to be positional and intermittent. Patient does not have any visual changes or other neurologic complaints. His neurologic exam is grossly normal.  Patient does not have any nystagmus on exam.  He is ambulating without difficulty. Patient's EKG is grossly unremarkable other than sinus bradycardia. Heart rate is 54. Patient's blood work is grossly unremarkable. He does have hematuria-CT abdomen pelvis does not show any acute cause.  AAA considered however patient has normal vitals, normal distal given the following medications while in the emergency department:  Orders Placed This Encounter   Medications    0.9 % sodium chloride bolus    iopamidol (ISOVUE-370) 76 % injection 80 mL    DISCONTD: sodium chloride flush 0.9 % injection 10 mL    0.9 % sodium chloride bolus     CONSULTS:  None    FINAL IMPRESSION      1. Dizziness    2.  Hematuria, unspecified type          DISPOSITION/PLAN   DISPOSITION Decision To Discharge 10/29/2020 03:35:30 PM      PATIENT REFERRED TO:  Roland Mullins MD  [place] [address]  [phone]    Schedule an appointment as soon as possible for a visit in 1 week      DISCHARGE MEDICATIONS:  Discharge Medication List as of 10/29/2020  3:36 PM        Chanda Nunes MD  Attending Emergency Physician                 Penny Randle MD  10/29/20 [address] [place] Jameel Drake MD  11/03/20 5633

## 2020-10-29 NOTE — ED NOTES
Patient presented to the ED with complaints of dizziness that started this morning. States he only feels dizzy when he is up and moving and it subsides when he sits down. He denies any fever, vomiting and diarrhea. He did state he has blood in urine.      Nino Wen RN  10/29/20 3968

## 2020-10-30 LAB
EKG ATRIAL RATE: 54 BPM
EKG P AXIS: 51 DEGREES
EKG P-R INTERVAL: 148 MS
EKG Q-T INTERVAL: 424 MS
EKG QRS DURATION: 88 MS
EKG QTC CALCULATION (BAZETT): 402 MS
EKG R AXIS: 35 DEGREES
EKG T AXIS: 37 DEGREES
EKG VENTRICULAR RATE: 54 BPM

## 2020-11-01 LAB — HCG, PREGNANCY URINE (POC): NEGATIVE

## 2020-12-04 NOTE — TELEPHONE ENCOUNTER
Request for Lipitor. Next Visit Date:  Future Appointments   Date Time Provider Fatou Iqra   1/12/2021  2:45 PM Rut Valadez MD [facility] Maintenance   Topic Date Due    Lipid screen  03/08/2020    Flu vaccine (1) 09/01/2020    A1C test (Diabetic or Prediabetic)  10/01/2020    Potassium monitoring  10/29/2021    Creatinine monitoring  10/29/2021    DTaP/Tdap/Td vaccine (3 - Td) 07/07/2030    Pneumococcal 0-64 years Vaccine  Completed    HIV screen  Completed    Hepatitis A vaccine  Aged Out    Hepatitis B vaccine  Aged Out    Hib vaccine  Aged Out    Meningococcal (ACWY) vaccine  Aged Out       Hemoglobin A1C (%)   Date Value   10/01/2019 5.7   03/08/2019 5.8   04/09/2018 5.5             ( goal A1C is < 7)   Microalb/Crt.  Ratio (mcg/mg creat)   Date Value   04/08/2016 6     LDL Cholesterol (mg/dL)   Date Value   03/08/2019 112       (goal LDL is <100)   AST (U/L)   Date Value   10/29/2020 15     ALT (U/L)   Date Value   10/29/2020 15     BUN (mg/dL)   Date Value   10/29/2020 12     BP Readings from Last 3 Encounters:   10/29/20 (!) 150/88   07/14/20 122/82   07/07/20 (!) 152/84          (goal 120/80)    All Future Testing planned in [facility]        Patient Active Problem List:     Asthma     Back pain     Obesity     Hyperlipidemia     Hypertension     Chronic bilateral low back pain without sciatica     Lumbar disc disease     Marijuana use     Low folate

## 2020-12-08 RX ORDER — ATORVASTATIN CALCIUM 20 MG/1
TABLET, FILM COATED ORAL
Qty: 30 TABLET | Refills: 1 | Status: SHIPPED | OUTPATIENT
Start: 2020-12-08 | End: 2021-03-25

## 2020-12-23 NOTE — TELEPHONE ENCOUNTER
Refill request for Folic Acid and Lisinopril. If appropriate please send medication(s) to patients pharmacy. Next appt: 1/12/2021      Health Maintenance   Topic Date Due    Hepatitis C screen  1970    Lipid screen  03/08/2020    Flu vaccine (1) 09/01/2020    A1C test (Diabetic or Prediabetic)  10/01/2020    Potassium monitoring  10/29/2021    Creatinine monitoring  10/29/2021    DTaP/Tdap/Td vaccine (3 - Td) 07/07/2030    Pneumococcal 0-64 years Vaccine  Completed    HIV screen  Completed    Hepatitis A vaccine  Aged Out    Hepatitis B vaccine  Aged Out    Hib vaccine  Aged Out    Meningococcal (ACWY) vaccine  Aged Out       Hemoglobin A1C (%)   Date Value   10/01/2019 5.7   03/08/2019 5.8   04/09/2018 5.5             ( goal A1C is < 7)   Microalb/Crt.  Ratio (mcg/mg creat)   Date Value   04/08/2016 6     LDL Cholesterol (mg/dL)   Date Value   03/08/2019 112       (goal LDL is <100)   AST (U/L)   Date Value   10/29/2020 15     ALT (U/L)   Date Value   10/29/2020 15     BUN (mg/dL)   Date Value   10/29/2020 12     BP Readings from Last 3 Encounters:   10/29/20 (!) 150/88   07/14/20 122/82   07/07/20 (!) 152/84          (goal 120/80)          Patient Active Problem List:     Asthma     Back pain     Obesity     Hyperlipidemia     Hypertension     Chronic bilateral low back pain without sciatica     Lumbar disc disease     Marijuana use     Low folate

## 2020-12-24 RX ORDER — LISINOPRIL 10 MG/1
TABLET ORAL
Qty: 30 TABLET | Refills: 0 | Status: SHIPPED | OUTPATIENT
Start: 2020-12-24 | End: 2021-01-27

## 2020-12-24 RX ORDER — FOLIC ACID 1 MG/1
1 TABLET ORAL DAILY
Qty: 90 TABLET | Refills: 0 | Status: SHIPPED | OUTPATIENT
Start: 2020-12-24 | End: 2021-01-12 | Stop reason: SDUPTHER

## 2021-01-12 ENCOUNTER — VIRTUAL VISIT (OUTPATIENT)
Dept: INTERNAL MEDICINE | Age: 51
End: 2021-01-12
Payer: COMMERCIAL

## 2021-01-12 DIAGNOSIS — M17.12 OSTEOARTHRITIS OF LEFT KNEE, UNSPECIFIED OSTEOARTHRITIS TYPE: ICD-10-CM

## 2021-01-12 DIAGNOSIS — Z11.59 NEED FOR HEPATITIS C SCREENING TEST: ICD-10-CM

## 2021-01-12 DIAGNOSIS — R31.0 GROSS HEMATURIA: ICD-10-CM

## 2021-01-12 DIAGNOSIS — R20.0 NUMBNESS OF FEET: ICD-10-CM

## 2021-01-12 DIAGNOSIS — F17.200 SMOKER: ICD-10-CM

## 2021-01-12 DIAGNOSIS — R73.02 IGT (IMPAIRED GLUCOSE TOLERANCE): ICD-10-CM

## 2021-01-12 DIAGNOSIS — E78.2 MIXED HYPERLIPIDEMIA: ICD-10-CM

## 2021-01-12 DIAGNOSIS — I10 ESSENTIAL HYPERTENSION: Primary | ICD-10-CM

## 2021-01-12 DIAGNOSIS — E53.8 LOW FOLATE: ICD-10-CM

## 2021-01-12 PROCEDURE — 99214 OFFICE O/P EST MOD 30 MIN: CPT | Performed by: INTERNAL MEDICINE

## 2021-01-12 RX ORDER — FOLIC ACID 1 MG/1
1 TABLET ORAL DAILY
Qty: 90 TABLET | Refills: 0 | Status: SHIPPED | OUTPATIENT
Start: 2021-01-12 | End: 2021-05-25 | Stop reason: SDUPTHER

## 2021-01-12 RX ORDER — LANOLIN ALCOHOL/MO/W.PET/CERES
1000 CREAM (GRAM) TOPICAL DAILY
Qty: 30 TABLET | Refills: 5 | Status: SHIPPED | OUTPATIENT
Start: 2021-01-12

## 2021-01-12 RX ORDER — FAMOTIDINE 20 MG/1
20 TABLET, FILM COATED ORAL 2 TIMES DAILY
Qty: 60 TABLET | Refills: 5 | Status: SHIPPED | OUTPATIENT
Start: 2021-01-12 | End: 2021-05-25 | Stop reason: SDUPTHER

## 2021-01-12 NOTE — PATIENT INSTRUCTIONS
-Pt due for 4 month f/u in May-- pt to call in April to set up an appt--reminder in [facility] to contact patient as well--AVS given to patient    -Bloodwork orders given to patient, they will have them done before their next visit. -Xray order given to pt, this test does not need to be scheduled, please take order with you to Main Registration at hospital and have completed before your next appointment. Referral to [facility] dropped into work queue for [employer], they will contact the patient to schedule. Phone number given to patient. [facility], [address] [place] [address] [address]   [place], [address] [place] [address]   Phone: [phone]    Referral to Podiatry given to patient with number to call, patient will call to schedule. Mark JOSHI [address], [address] [place] [address] LinaLei [address]   [phone]    -B. Laird Nyhan

## 2021-01-12 NOTE — PROGRESS NOTES
Marco Horvath is a 53 y.o. male evaluated via telephone on 1/12/2021. Consent:  He and/or health care decision maker is aware that that he may receive a bill for this telephone service, depending on his insurance coverage, and has provided verbal consent to proceed: Yes      Documentation:  I communicated with the patient and/or health care decision maker about his chronic health conditions. He is complaining of left knee pain and swelling. He works in a factory. He says he needs FMLA papers filled as some days it swelling too much and he cannot go to work. He has a known history of osteoarthritis of knee. He has not seen orthopedics in 3 years. He says he was told he may need a knee replacement. Has not done therapy in some time either. He also continues to have some burning in his feet. It is worse on the left foot. He was low in folic acid and that has been replaced along with B12. He had an EMG done in 2019 which showed a possible tarsal tunnel syndrome but no significant radiculopathy or new neuropathy. Will refer him to podiatry again. He may need orthotics which will help with his pain. He is no longer going to pain management. He was going to [place] and getting some Percocets which he has not been able to do in a few months. He has history of hypertension, impaired glucose tolerance and hyperlipidemia. He also smokes cigars daily. He was recently in the emergency room a few months ago for hematuria. UA did show 3+ hemoglobin and blood. CT scan of his abdomen was negative. He denies any more blood since then and no dysuria or pain. Because of his history of smoking we will repeat another UA and if he has any microscopic hematuria will refer him to urology for evaluation.     Details of this discussion including any medical advice provided: Patient advised to get a new knee x-ray and follow-up with orthopedics. Also follow-up with podiatry for his numbness of foot. Avoid smoking. Labs to be obtained. Refills given. FMLA forms to be filled if needed. Will refer him to urology. Advised to get a repeat UA done. 1. Essential hypertension  Same meds    2. Gross hematuria    - Urinalysis with Microscopic; Future    3. Osteoarthritis of left knee, unspecified osteoarthritis type    - XR KNEE LEFT (3 VIEWS); Future  - [address], , Orthopedic Surgery, [place] [address]    4. IGT (impaired glucose tolerance)    - Hemoglobin A1C; Future    5. Numbness of feet  Possible tarsal tunnel syndrome    - [facility] - Antonieta Wing DPM, Podiatry, [place]    6. Mixed hyperlipidemia    - Lipid Panel; Future    7. Low folate    - vitamin B-12 (CYANOCOBALAMIN) 1000 MCG tablet; Take 1 tablet by mouth daily  Dispense: 30 tablet; Refill: 5  - folic acid (FOLVITE) 1 MG tablet; Take 1 tablet by mouth daily  Dispense: 90 tablet; Refill: 0    8. Need for hepatitis C screening test    - Hepatitis C Antibody; Future    9. Smoker  Discussed smoking cessation      I affirm this is a Patient Initiated Episode with a Patient who has not had a related appointment within my department in the past 7 days or scheduled within the next 24 hours.     Patient identification was verified at the start of the visit: Yes    Total Time: minutes: 21-30 minutes    Note: not billable if this call serves to triage the patient into an appointment for the relevant concern      Suellen Slade

## 2021-01-14 ENCOUNTER — TELEPHONE (OUTPATIENT)
Dept: INTERNAL MEDICINE | Age: 51
End: 2021-01-14

## 2021-01-14 NOTE — TELEPHONE ENCOUNTER
PA request received for Vitamin B-12 1000MCG tablets    PA processed and submitted to pt insurance, waiting for response in regards to medication coverage

## 2021-01-22 DIAGNOSIS — M25.562 LEFT KNEE PAIN, UNSPECIFIED CHRONICITY: Primary | ICD-10-CM

## 2021-01-25 ENCOUNTER — OFFICE VISIT (OUTPATIENT)
Dept: ORTHOPEDIC SURGERY | Age: 51
End: 2021-01-25
Payer: COMMERCIAL

## 2021-01-25 VITALS
SYSTOLIC BLOOD PRESSURE: 136 MMHG | BODY MASS INDEX: 28.36 KG/M2 | TEMPERATURE: 98.1 F | HEIGHT: 74 IN | WEIGHT: 221 LBS | HEART RATE: 63 BPM | DIASTOLIC BLOOD PRESSURE: 82 MMHG

## 2021-01-25 DIAGNOSIS — S83.242A ACUTE MEDIAL MENISCUS TEAR OF LEFT KNEE, INITIAL ENCOUNTER: Primary | ICD-10-CM

## 2021-01-25 DIAGNOSIS — M17.12 PRIMARY OSTEOARTHRITIS OF LEFT KNEE: ICD-10-CM

## 2021-01-25 PROCEDURE — 4004F PT TOBACCO SCREEN RCVD TLK: CPT | Performed by: ORTHOPAEDIC SURGERY

## 2021-01-25 PROCEDURE — G8417 CALC BMI ABV UP PARAM F/U: HCPCS | Performed by: ORTHOPAEDIC SURGERY

## 2021-01-25 PROCEDURE — 99203 OFFICE O/P NEW LOW 30 MIN: CPT | Performed by: ORTHOPAEDIC SURGERY

## 2021-01-25 PROCEDURE — G8484 FLU IMMUNIZE NO ADMIN: HCPCS | Performed by: ORTHOPAEDIC SURGERY

## 2021-01-25 PROCEDURE — G8427 DOCREV CUR MEDS BY ELIG CLIN: HCPCS | Performed by: ORTHOPAEDIC SURGERY

## 2021-01-25 ASSESSMENT — ENCOUNTER SYMPTOMS
COUGH: 0
ABDOMINAL DISTENTION: 0
CHEST TIGHTNESS: 0
NAUSEA: 0
APNEA: 0
ABDOMINAL PAIN: 0
CONSTIPATION: 0
SHORTNESS OF BREATH: 0
COLOR CHANGE: 0
DIARRHEA: 0
VOMITING: 0

## 2021-01-25 NOTE — PROGRESS NOTES
[facility] [address] [facility]  [address] [place] [id number]  Dept: [phone]  Dept Fax: [phone]          Left Knee - New Patient     Subjective:     Chief Complaint   Patient presents with    Knee Pain     Left knee OA     HPI:     Poncho Gauthier is a full time  presents today for Left knee pain. The pain has been present for 10 years. The patient recalls a no specific injury where the knee. Attributes some pain to a basketball injury 10-20yrs ago when his knee bent the wrong way and its \"catching up to him now\". The patient has tried ibuprofen, heat, ice, tylenol with no improvement. The pain is now described as burning, Achy and Sharp . There occ pain on weight bearing. The knee has swelled. There is not painful popping and clicking. The knee has caught or locked up. The knee has given out. It is  stiff upon arising from sitting. It is  painful to go up and down stairs and sit for a prolonged time. The patient has had a cortisone injection. The patient has not tried a lubrication injection. The patient has not tried physical therapy. The patient has not had surgery. ROS:   Review of Systems   Constitutional: Positive for activity change. Negative for appetite change. Respiratory: Negative for apnea, cough, chest tightness and shortness of breath. Cardiovascular: Negative for chest pain, palpitations and leg swelling. Gastrointestinal: Negative for abdominal distention, abdominal pain, constipation, diarrhea, nausea and vomiting. Genitourinary: Negative for difficulty urinating, dysuria and hematuria. Musculoskeletal: Positive for gait problem and myalgias. Negative for arthralgias and joint swelling. Skin: Negative for color change and rash. Neurological: Negative for dizziness, weakness, numbness and headaches. Psychiatric/Behavioral: Negative for sleep disturbance.        Past Medical History: Past Medical History:   Diagnosis Date    Asthma     Chronic back pain     Headache(784.0)     HLD (hyperlipidemia) 11/6/2013    Hypertension 7/10/2014    Obesity 10/10/2013    Substance abuse ([facility] [address]     Type II or unspecified type diabetes mellitus without mention of complication, not stated as uncontrolled        Past Surgical History:    No past surgical history on file. CurrentMedications:   Current Outpatient Medications   Medication Sig Dispense Refill    famotidine (PEPCID) 20 MG tablet Take 1 tablet by mouth 2 times daily 60 tablet 5    vitamin B-12 (CYANOCOBALAMIN) 1000 MCG tablet Take 1 tablet by mouth daily 30 tablet 5    folic acid (FOLVITE) 1 MG tablet Take 1 tablet by mouth daily 90 tablet 0    atorvastatin (LIPITOR) 20 MG tablet TAKE 1 TABLET BY MOUTH DAILY 30 tablet 1    butalbital-acetaminophen-caffeine (ESGIC) -40 MG per tablet Take 1 tablet by mouth every 6 hours as needed for Headaches 180 tablet 0    oxyCODONE-acetaminophen (PERCOCET)  MG per tablet Take 1 tablet by mouth 2 times daily.  ASPIRIN LOW DOSE 81 MG EC tablet TAKE 1 TABLET BY MOUTH DAILY 30 tablet 11    ibuprofen (ADVIL;MOTRIN) 800 MG tablet Take 800 mg by mouth daily as needed for Pain      VENTOLIN  (90 BASE) MCG/ACT inhaler INHALE 2 PUFFS EVERY SIX HOURS AS NEEDED FOR WHEEZING 18 g 11    lisinopril (PRINIVIL;ZESTRIL) 10 MG tablet TAKE 1 TABLET BY MOUTH DAILY 30 tablet 3     No current facility-administered medications for this visit. Allergies:    Patient has no known allergies.     Social History:   Social History     Socioeconomic History    Marital status:      Spouse name: None    Number of children: None    Years of education: None    Highest education level: None   Occupational History    Occupation: SSI     Comment: depression   Social Needs    Financial resource strain: None    Food insecurity     Worry: None     Inability: None    Transportation needs NEURO: The patient responds to light touch throughout left LE. Patellar and Achilles reflexes are 2/4. VASC: The left LE is neurovascularly intact with 2/4 DP and 2/4 PT pulses. Brisk capillary refill. ROM: -10/128 degrees. There is mild effusion. Bakers cyst present  MUSC: good quad tone  LIGAMENT: Lachman's test is Negative with Good endpoint. Anterior drawer Negative. Posterior drawer Negative. There is No varus instability at 0 degrees and No varus instability at 30 degrees. There is No valgus instability at 0 degrees and No valgus instability at 30 degrees. SPECIAL: Michael test is positive with + clunks, - crepitation, and + pain. PALP: There is medial joint line pain. Assessment:     1. Acute medial meniscus tear of left knee, initial encounter    2. Primary osteoarthritis of left knee      Procedures:    Procedure: no  Radiology:   KNEE X-RAY    4 views of the left knee including AP, bilateral tunnel, and lateral in the upright position, and skyline views reveal anatomic alignment with no fracture or dislocation. Kellgren grade 2 changes of osteoarthritis (joint space narrowing, osteophyte, subchondral sclerosis, bony deformity/cyst) of the medial and patellofemora compartment(s). No osseous loose bodies. No bony erosion or periosteal reaction. No soft tissue masses. Impression: mild to moderate osteoarthritis changes of the left knee.    Plan: Treatment : I reviewed the X-ray and MRI. MRI was fr/om 5/10/2016 with the patient. I also reviewed previous knee x-rays from 6/15/2016 and 4/8/16. We discussed the etiologies and natural histories of meniscus tear of the left knee. We discussed the various treatment alternatives including anti-inflammatory medications, physical therapy, injections, further imaging studies and as a last result surgery. During today's visit, I do think he does have an underlying meniscus tear as was seen on previous MRI from 2016. I would like to get another MRI to see the extent of the arthritis on his knee as well to compare images to 2016 as there was a meniscus tear evident on that MRI as well. He does report of recurrent instability with episodes of his knee giving out where he needs to physically manipulate his knee to get the pain to go away. I did explain that he does have good motion and good quad tone so I do not think physical therapy would help him right now. The patient has opted for MRI of the left knee. A physical therapy prescription was not given. Patient should return to the clinic after MRI to follow up with Charles Mujica D.O. . The patient will call the office immediately with any problems. No orders of the defined types were placed in this encounter. Orders Placed This Encounter   Procedures    MRI KNEE LEFT WO CONTRAST     Standing Status:   Future     Standing Expiration Date:   1/25/2022     Order Specific Question:   Reason for exam:     Answer:   mechanical catching and locking       Jean FRANCISCO MS, AT, ATC, am scribing for and in the presence of Charles OLIVER. 1/31/2021  5:33 PM    Electronically signed by Lauren Orantes DO, on 1/31/2021 at 5:33 PM

## 2021-01-26 DIAGNOSIS — I10 ESSENTIAL HYPERTENSION: ICD-10-CM

## 2021-01-27 RX ORDER — LISINOPRIL 10 MG/1
TABLET ORAL
Qty: 30 TABLET | Refills: 3 | Status: SHIPPED | OUTPATIENT
Start: 2021-01-27 | End: 2021-05-25 | Stop reason: SDUPTHER

## 2021-01-27 NOTE — TELEPHONE ENCOUNTER
Request for Lisinopril. Pt on wait list for 4 month f/u in May    Next Visit Date:  Future Appointments   Date Time Provider Fatou Escalona   2/26/2021  3:00 PM MD Kelsey Capellan [address] Maintenance   Topic Date Due    Hepatitis C screen  1970    Lipid screen  03/08/2020    A1C test (Diabetic or Prediabetic)  10/01/2020    Flu vaccine (1) 06/30/2021 (Originally 9/1/2020)    Potassium monitoring  10/29/2021    Creatinine monitoring  10/29/2021    DTaP/Tdap/Td vaccine (3 - Td) 07/07/2030    Pneumococcal 0-64 years Vaccine  Completed    HIV screen  Completed    Hepatitis A vaccine  Aged Out    Hepatitis B vaccine  Aged Out    Hib vaccine  Aged Out    Meningococcal (ACWY) vaccine  Aged Out       Hemoglobin A1C (%)   Date Value   10/01/2019 5.7   03/08/2019 5.8   04/09/2018 5.5             ( goal A1C is < 7)   Microalb/Crt.  Ratio (mcg/mg creat)   Date Value   04/08/2016 6     LDL Cholesterol (mg/dL)   Date Value   03/08/2019 112       (goal LDL is <100)   AST (U/L)   Date Value   10/29/2020 15     ALT (U/L)   Date Value   10/29/2020 15     BUN (mg/dL)   Date Value   10/29/2020 12     BP Readings from Last 3 Encounters:   01/25/21 136/82   10/29/20 (!) 150/88   07/14/20 122/82          (goal 120/80)    All Future Testing planned in [facility]  Lab Frequency Next Occurrence   Hemoglobin A1C Once 04/20/2021   Lipid Panel Once 04/22/2021   Hepatitis C Antibody Once 04/20/2021   Urinalysis with Microscopic Once 04/15/2021   XR KNEE LEFT (3 VIEWS) Once 01/12/2021   MRI KNEE LEFT WO CONTRAST Once 01/25/2021         Patient Active Problem List:     Asthma     Back pain     Obesity     Hyperlipidemia     Hypertension     Chronic bilateral low back pain without sciatica     Lumbar disc disease     Marijuana use     Low folate

## 2021-02-25 DIAGNOSIS — M79.672 BILATERAL FOOT PAIN: Primary | ICD-10-CM

## 2021-02-25 DIAGNOSIS — M79.671 BILATERAL FOOT PAIN: Primary | ICD-10-CM

## 2021-02-26 ENCOUNTER — OFFICE VISIT (OUTPATIENT)
Dept: ORTHOPEDIC SURGERY | Age: 51
End: 2021-02-26
Payer: COMMERCIAL

## 2021-02-26 VITALS
OXYGEN SATURATION: 99 % | HEIGHT: 74 IN | WEIGHT: 220 LBS | BODY MASS INDEX: 28.23 KG/M2 | HEART RATE: 70 BPM | RESPIRATION RATE: 13 BRPM | TEMPERATURE: 97.4 F

## 2021-02-26 DIAGNOSIS — M19.079 ARTHRITIS OF MIDFOOT: ICD-10-CM

## 2021-02-26 DIAGNOSIS — L84 CALLUS OF FOOT: ICD-10-CM

## 2021-02-26 DIAGNOSIS — M72.2 PLANTAR FASCIITIS: Primary | ICD-10-CM

## 2021-02-26 DIAGNOSIS — M24.573 EQUINUS CONTRACTURE OF ANKLE: ICD-10-CM

## 2021-02-26 PROCEDURE — G8484 FLU IMMUNIZE NO ADMIN: HCPCS | Performed by: ORTHOPAEDIC SURGERY

## 2021-02-26 PROCEDURE — G8427 DOCREV CUR MEDS BY ELIG CLIN: HCPCS | Performed by: ORTHOPAEDIC SURGERY

## 2021-02-26 PROCEDURE — 4004F PT TOBACCO SCREEN RCVD TLK: CPT | Performed by: ORTHOPAEDIC SURGERY

## 2021-02-26 PROCEDURE — G8417 CALC BMI ABV UP PARAM F/U: HCPCS | Performed by: ORTHOPAEDIC SURGERY

## 2021-02-26 PROCEDURE — 99214 OFFICE O/P EST MOD 30 MIN: CPT | Performed by: ORTHOPAEDIC SURGERY

## 2021-02-26 RX ORDER — NAPROXEN 500 MG/1
500 TABLET ORAL 2 TIMES DAILY PRN
Qty: 60 TABLET | Refills: 0 | Status: SHIPPED | OUTPATIENT
Start: 2021-02-26 | End: 2021-05-25 | Stop reason: SDUPTHER

## 2021-02-26 NOTE — PROGRESS NOTES
[facility]  [facility] [place] [address]  Dept: [phone]    Ambulatory Orthopedic Consult      CHIEF COMPLAINT:    Chief Complaint   Patient presents with    Foot Pain     Bilateral Foot       HISTORY OF PRESENT ILLNESS:      The patient is a 53 y.o. male who is being seen for consultation and evaluation of pain at the bilateral (right equivalent to left) plantar aspect of the foot from the forefoot to the heel, which began atraumatically over 3 years ago. The pain is worse with activity and better with rest. The patient reports a progressive course. The patient has tried:      [x]  rest/activity modification          []  NSAIDs      []  opiates      []  orthotics        [x]  change in shoes   []  home exercises  []  physical therapy      []  CAM boot     []  brace:    []  injection:       []  surgery:      He reports that he has seen a podiatrist in the past for this problem. He denies any numbness/tingling, but does report that the pain feels sharp as well as burning. He reports that he has been treated for neuropathy in the past.  He does report pain with his first steps in the morning. REVIEW OF SYSTEMS:  Constitutional: Negative for fever. HENT: Negative for tinnitus. Eyes: Negative for pain. Respiratory: Negative for shortness of breath. Cardiovascular: Negative for chest pain. Gastrointestinal: Negative for abdominal pain. Genitourinary: Negative for dysuria. Skin: Negative for rash. Neurological: Negative for headaches. Hematological: Does not bruise/bleed easily.    Musculoskeletal: See HPI for pertinent positives     Past Medical History: Packs/day: 1.00     Years: 10.00     Pack years: 10.00     Types: Cigars    Smokeless tobacco: Never Used   Substance and Sexual Activity    Alcohol use: Not Currently     Alcohol/week: 0.0 standard drinks     Comment: social rare    Drug use: Yes     Frequency: 7.0 times per week     Types: Marijuana     Comment: every other day    Sexual activity: Not on file     Occupation:  full-time     OBJECTIVE:  Pulse 70   Temp 97.4 °F (36.3 °C)   Resp 13   Ht 6' 2\" (1.88 m)   Wt 220 lb (99.8 kg)   SpO2 99%   BMI 28.25 kg/m²    Psych: alert and oriented to person, time, and place  Cardio:  well perfused extremities  Resp:  normal respiratory effort  Skin:  no cyanosis  Hem/lymph:  no lymphedema  Neuro:  sensation to light touch grossly intact throughout all nerve distributions in the foot   Musculoskeletal:    RLE:  Alignment:  Heel neutral pes planus  Vascular: Toes warm and well perfused, compartments soft/compressible. No significant swelling of foot. Skin: Intact without rash/lesions/AV malformations. Strength: Able to fire/perform the following with appropriate strength:    [x]  Tib Ant:     [x]  Gastroc-Soleus:         [x]  Inversion:    [x]  Eversion:         [x]  FHL:     [x]  EHL:      Motion:  Normal for the following joints:    [x]  Ankle:      [x]  Subtalar:        [x]  1st MTP:      []  1st TMT:            Tenderness to Palpation:    Tenderness to palpation: Plantar aspect of the foot--mild  -equinus  -Multiple calluses along the plantar forefoot and posterior heel      LLE:  Alignment:  Heel neutral pes planus  Vascular: Toes warm and well perfused, compartments soft/compressible. No significant swelling of foot. Skin: Intact without rash/lesions/AV malformations.   Strength: Able to fire/perform the following with appropriate strength:    [x]  Tib Ant:     [x]  Gastroc-Soleus:         [x]  Inversion:    [x]  Eversion:         [x]  FHL:     [x]  EHL: Motion:  Normal for the following joints:    [x]  Ankle:      [x]  Subtalar:        [x]  1st MTP:      []  1st TMT:            Tenderness to Palpation:    Tenderness to palpation: Plantar aspect of the foot--mild  -equinus  -Multiple calluses along the plantar forefoot and posterior heel      RADIOLOGY:   2/26/2021 FINDINGS:  Three weightbearing views (AP, Mortise, and Lateral) of the bilateral ankle and three weightbearing views (AP, Oblique, Lateral) of the bilateral foot were obtained in the office today and reviewed, revealing no acute fracture, dislocation, or radioopaque foreign body/tumor. The ankle mortise is maintained with no widening of the clear spaces. Bilateral findings: Meary's angle is apex plantar bilaterally. Type III accessory navicular. Degenerative changes throughout the midfoot with joint space narrowing, sclerosis, and osteophytes. IMPRESSION:  No acute fracture/dislocation. Pes planus. Type III accessory navicular. Electronically signed by Marquita Horn MD      ASSESSMENT AND PLAN:  Fco Morris was seen today for Foot Pain (Bilateral Foot)  The primary encounter diagnosis was Plantar fasciitis. Diagnoses of Equinus contracture of ankle and Callus of foot were also pertinent to this visit. Body mass index is 28.25 kg/m². He has bilateral foot pain secondary to plantar fasciitis and underlying posterior tibial tendinitis with insufficiency, mild midfoot arthritis, along with bilateral equinus contractures. The differential diagnosis I am considering also includes neuropathic pain (history of lumbar disc disease with chronic low back pain, describes the pain is burning, has been treated for neuropathy of his bilateral feet in the past, history of prediabetes and low folate level).  He also reports that he had an EMG/NCS obtained previously, and reports that this was normal. Notably, he has the past medical history as above. He has a history of asthma, lumbar disc disease with chronic low back pain, hypertension, marijuana use, prediabetes, and tobacco use (reports he smokes cigars). I had a long discussion today with the patient about the likely diagnosis and its natural history, physical exam and imaging findings, as well as treatment options in detail. We discussed rest/activity modification, swelling control, NSAIDs/Acetaminophen/topical anesthetics, orthotics/shoewear modification, bracing/immobilization, injections, and physical therapy. Surgically, I did not recommend any intervention at this time, the patient agrees. Orders/referrals were placed as below at today's visit. The patient was provided a night splint, and he will use this every night for 6 weeks. I referred the patient to physical therapy for plantar fascia stretching. The patient was information on how to obtain an over-the-counter flatfoot style orthotic. The patient will use the following medication(s):  scheduled oral NSAIDs. At today's visit, he was ordered a two week oral course of NSAIDs as below, along with GI prophylaxis, and we discussed the appropriate risks. The patient was provided teaching material on this today, and will avoid using multiple NSAIDs at the same time. The patient was also provided a referral to see a podiatrist for his numerous callosities/hyperkeratotic areas of his bilateral feet. All questions were answered and the patient agrees with the above plan. The patient will return to clinic in 3 months without x-rays. At his next visit, depending on how he is doing, we may consider heel cups, discuss diagnostic/therapeutic heel injections, and possibly aim treatment at his midfoot arthritis. Return in about 3 months (around 5/26/2021). No orders of the defined types were placed in this encounter.     Orders Placed This Encounter   Procedures  Ambulatory referral to Physical Therapy     Referral Priority:   Routine     Referral Type:   Eval and Treat     Referral Reason:   Specialty Services Required     Requested Specialty:   Physical Therapy     Number of Visits Requested:   [facility] Bernardino Dyson DPM, Podiatry, [place]     Referral Priority:   Routine     Referral Type:   Eval and Treat     Referral Reason:   Specialty Services Required     Referred to Provider:   Bertin Castillo DPM     Requested Specialty:   Podiatry     Number of Visits Requested:   1         Marie Case MD  Orthopedic Surgery, Foot and Ankle        Please excuse any typos/errors, as this note was created with the assistance of voice recognition software. While intending to generate a document that actually reflects the content of the visit, the document can still have some errors including those of syntax and sound-a-like substitutions which may escape proof reading. In such instances, actual meaning can be extrapolated by context.

## 2021-02-26 NOTE — LETTER
Dr. Ifeoma Fonseca  [address] [place] [address] [address] [facility]  [phone]        2/26/2021     Patient: Vaishnavi Hough  YOB: 1970    Dear Maik Aggarwal MD,    I had the pleasure of seeing one of your patients, Vaishnavi Hough recently in the office. Below are the relevant portions of my assessment and plan of care. ASSESSMENT AND PLAN:  He has bilateral foot pain secondary to plantar fasciitis and underlying posterior tibial tendinitis with insufficiency, mild midfoot arthritis, along with bilateral equinus contractures. The differential diagnosis I am considering also includes neuropathic pain (history of lumbar disc disease with chronic low back pain, describes the pain is burning, has been treated for neuropathy of his bilateral feet in the past, history of prediabetes and low folate level). He also reports that he had an EMG/NCS obtained previously, and reports that this was normal.    Notably, he has the past medical history as above. He has a history of asthma, lumbar disc disease with chronic low back pain, hypertension, marijuana use, prediabetes, and tobacco use (reports he smokes cigars). I had a long discussion today with the patient about the likely diagnosis and its natural history, physical exam and imaging findings, as well as treatment options in detail. We discussed rest/activity modification, swelling control, NSAIDs/Acetaminophen/topical anesthetics, orthotics/shoewear modification, bracing/immobilization, injections, and physical therapy. Surgically, I did not recommend any intervention at this time, the patient agrees.

## 2021-03-15 ENCOUNTER — OFFICE VISIT (OUTPATIENT)
Dept: PODIATRY | Age: 51
End: 2021-03-15
Payer: COMMERCIAL

## 2021-03-15 VITALS — HEIGHT: 74 IN | WEIGHT: 215 LBS | BODY MASS INDEX: 27.59 KG/M2

## 2021-03-15 DIAGNOSIS — M54.16 LUMBAR RADICULOPATHY: ICD-10-CM

## 2021-03-15 DIAGNOSIS — L84 CORNS AND CALLOSITIES: ICD-10-CM

## 2021-03-15 DIAGNOSIS — M79.672 PAIN IN LEFT FOOT: ICD-10-CM

## 2021-03-15 DIAGNOSIS — R20.8 BURNING SENSATION OF TOE AND FOOT: ICD-10-CM

## 2021-03-15 DIAGNOSIS — M79.671 PAIN IN RIGHT FOOT: ICD-10-CM

## 2021-03-15 DIAGNOSIS — M79.675 PAIN IN TOE OF LEFT FOOT: ICD-10-CM

## 2021-03-15 DIAGNOSIS — L85.3 XEROSIS CUTIS: Primary | ICD-10-CM

## 2021-03-15 DIAGNOSIS — M79.674 PAIN IN TOE OF RIGHT FOOT: ICD-10-CM

## 2021-03-15 PROCEDURE — G8417 CALC BMI ABV UP PARAM F/U: HCPCS | Performed by: PODIATRIST

## 2021-03-15 PROCEDURE — 11057 PARNG/CUTG B9 HYPRKR LES >4: CPT | Performed by: PODIATRIST

## 2021-03-15 PROCEDURE — 4004F PT TOBACCO SCREEN RCVD TLK: CPT | Performed by: PODIATRIST

## 2021-03-15 PROCEDURE — G8427 DOCREV CUR MEDS BY ELIG CLIN: HCPCS | Performed by: PODIATRIST

## 2021-03-15 PROCEDURE — G8484 FLU IMMUNIZE NO ADMIN: HCPCS | Performed by: PODIATRIST

## 2021-03-15 PROCEDURE — 99203 OFFICE O/P NEW LOW 30 MIN: CPT | Performed by: PODIATRIST

## 2021-03-15 PROCEDURE — 3017F COLORECTAL CA SCREEN DOC REV: CPT | Performed by: PODIATRIST

## 2021-03-15 RX ORDER — UREA 40 %
CREAM (GRAM) TOPICAL
Qty: 198.4 G | Refills: 3 | Status: SHIPPED | OUTPATIENT
Start: 2021-03-15

## 2021-03-15 ASSESSMENT — ENCOUNTER SYMPTOMS
NAUSEA: 0
SHORTNESS OF BREATH: 0
BACK PAIN: 0
COLOR CHANGE: 0
DIARRHEA: 0

## 2021-03-15 NOTE — PROGRESS NOTES
Vaishnavi Hough is a 49 y.o. male who presents to the office today with chief complaint of callous to both feet. Chief Complaint   Patient presents with   Jaye Au     left foot    Foot Pain     b/l burning/ has had pain for about a year     Other     xrays b/l feet and ankles 2/26/2021   Symptoms began about 1 year(s) ago. Patient denies injury to the feet. Patient states that the callouses are painful with pressure. Pain is rated 10 out of 10 at it's worst and is described as intermittent. Treatments prior to today's visit include: None. Patient also complains of burning to both feet and this has been present for about one year. Patient denies any treatment for the burning. Patient states that he is a prediabetic. Patient complains of lower back pain. Patient states that the burning is greatest when in bed at night and if he is in one position too long. No Known Allergies    Past Medical History:   Diagnosis Date    Asthma     Chronic back pain     Headache(784.0)     HLD (hyperlipidemia) 11/6/2013    Hypertension 7/10/2014    Obesity 10/10/2013    Substance abuse ([facility] [address]     Type II or unspecified type diabetes mellitus without mention of complication, not stated as uncontrolled        Prior to Admission medications    Medication Sig Start Date End Date Taking? Authorizing Provider   Urea (CARMOL) 40 % cream Apply to affected areas of both feet twice daily.  3/15/21  Yes Opal Hamilton DPM   naproxen (EC NAPROSYN) 500 MG EC tablet Take 1 tablet by mouth 2 times daily as needed for Pain 2/26/21  Yes Frances Croft MD   lisinopril (PRINIVIL;ZESTRIL) 10 MG tablet TAKE 1 TABLET BY MOUTH DAILY 1/27/21  Yes Maik Aggarwal MD   famotidine (PEPCID) 20 MG tablet Take 1 tablet by mouth 2 times daily 1/12/21  Yes Maik Aggarwal MD   vitamin B-12 (CYANOCOBALAMIN) 1000 MCG tablet Take 1 tablet by mouth daily 1/12/21  Yes Maik Aggarwal MD   folic acid (FOLVITE) 1 MG tablet Take 1 tablet by mouth daily 1/12/21  Yes Stacy Smiley MD   atorvastatin (LIPITOR) 20 MG tablet TAKE 1 TABLET BY MOUTH DAILY 12/8/20  Yes Stacy Smiley MD   butalbital-acetaminophen-caffeine [facility]) -53 MG per tablet Take 1 tablet by mouth every 6 hours as needed for Headaches 4/22/20  Yes Chevis Lian, APRN - CNP   oxyCODONE-acetaminophen (PERCOCET)  MG per tablet Take 1 tablet by mouth 2 times daily. 1/16/20  Yes Historical Provider, MD   ASPIRIN LOW DOSE 81 MG EC tablet TAKE 1 TABLET BY MOUTH DAILY 9/12/19  Yes Stacy Smiley MD   ibuprofen (ADVIL;MOTRIN) 800 MG tablet Take 800 mg by mouth daily as needed for Pain   Yes Historical Provider, MD   VENTOLIN  (90 BASE) MCG/ACT inhaler INHALE 2 PUFFS EVERY SIX HOURS AS NEEDED FOR WHEEZING 7/7/14  Yes Rodrigo Moreno MD       No past surgical history on file. Family History   Problem Relation Age of Onset   Luanne Rogers Cancer Mother         lung    Other Mother         sickle cell anemia       Social History     Tobacco Use    Smoking status: Current Some Day Smoker     Packs/day: 1.00     Years: 10.00     Pack years: 10.00     Types: Cigars    Smokeless tobacco: Never Used   Substance Use Topics    Alcohol use: Not Currently     Alcohol/week: 0.0 standard drinks     Comment: social rare       Review of Systems   Constitutional: Negative for activity change, appetite change, chills, diaphoresis, fatigue and fever. Respiratory: Negative for shortness of breath. Cardiovascular: Negative for leg swelling. Gastrointestinal: Negative for diarrhea and nausea. Endocrine: Negative for cold intolerance, heat intolerance and polyuria. Musculoskeletal: Negative for arthralgias, back pain, gait problem, joint swelling and myalgias. Skin: Negative for color change, pallor, rash and wound. Allergic/Immunologic: Negative for environmental allergies and food allergies. Neurological: Negative for dizziness, weakness, light-headedness and numbness. Hematological: Does not bruise/bleed easily. Psychiatric/Behavioral: Negative for behavioral problems, confusion and self-injury. The patient is not nervous/anxious. Vitals: There were no vitals filed for this visit. General: AAO x 3 in NAD. Integument: There are no rashes, ulcers, or breaks in the skin noted to the bilateral lower extremities. There is no induration, subcutaneous nodules, or tightening of the skin noted to the bilateral.     Toenails 1-5 of the right foot do not present with thickness, elongation, discoloration, brittleness, subungual debris. Toenails 1-5 of the left foot do not present with thickness, elongation, discoloration, brittleness, subungual debris. Interdigital maceration absent to web spaces 1-4, Bilateral.     There are preulcerative lesions noted to the right foot to the plantar aspect of the hallux, to the plantar lateral aspect of the fifth toe, submetatarsal head one, and to the posterior rim of the heel. There is pain with palpation to these lesions. Debridement of these lesions with a fifteen blade reveals a central core to all lesions. The core of each lesion was also debrided with a fifteen blade. No signs of bacterial infection are noted to any of the lesions. There are preulcerative lesions noted to the left foot to the plantar aspect of the hallux, to the dorsolateral and plantar lateral aspects of the fifth toe, submetatarsal heads one and five, and to the posterior rim of the heel. There is pain with palpation to these lesions. Debridement of these lesions with a fifteen blade reveals a central core to all lesions. The core of each lesion was also debrided with a fifteen blade. No signs of bacterial infection are noted to any of the lesions. The skin to the bilateral feet is not thin and shiny. The skin to the bilateral feet is  warm, supple, and dry. Vascular: DP pulse of the right foot is  palpable.      DP pulse of the left foot is  palpable. PT pulse of the right foot is  palpable. PT pulse of the left foot is  palpable. CFT is less than 3 secs to the digits of the right foot. CFT is less than 3 secs to the digits of the left foot. There is no edema noted to the bilateral foot or ankle. There is hair growth noted to the digits of the bilateral feet. There are no varicosities noted to the right foot/ankle. There are no varicosities noted to the left foot/ankle. Erythema is absent to the bilateral feet. Neurological: Reflexes are present to the right plantar foot and to the Achilles tendon. Reflexes are present to the left plantar foot and to the Achilles tendon. Epicritic sensation is  intact to the right foot. Epicritic sensation is  intact to the left foot. Musculoskeletal:  Muscle strength is +5/5 to all four muscle groups of the right lower extremity and +5/5 to all four muscle groups of the left lower extremity. There are no areas of subluxation, dislocation, or laxity noted to either lower extremity. Range of motion to the right ankle is  free of pain or grinding. Range of motion to the left ankle is  free of pain or grinding. Range of motion to the right subtalar joint is  free of pain or grinding. Range of motion to the left subtalar joint is  free of pain or grinding. No abnormalities, asymmetries, or misalignments are seen between the extremities. Weightbearing evaluation does not reveal rearfoot eversion, medial prominence of the talar head, loss of the medial longitudinal arch height, and too many toes sign bilaterally. The lesser digits of the right foot are not contracted. The lesser digits of the left foot are not contracted. There is no prominence noted to the first metatarsal head without abduction of the hallux of the right foot.      There is no prominence noted to the first metatarsal head without abduction of the hallux of the left foot. Shoe examination was performed. Biomechanical Exam: normal bilaterally. X-ray's reviewed from the hospital: AP, Lateral, and Medial Oblique of the bilateral foot and ankle. Findings: No fracture or stress fracture is noted to either foot or ankle. Asessment: Patient is a 49 y.o. male with:    Diagnosis Orders   1. Xerosis cutis  TRIM BENIGN HYPERKERATOTIC SKIN LESION,>4    Urea (CARMOL) 40 % cream   2. Corns and callosities  TRIM BENIGN HYPERKERATOTIC SKIN LESION,>4    Urea (CARMOL) 40 % cream   3. Lumbar radiculopathy  [facility]   4. Burning sensation of toe and foot  [facility]   5. Pain in left foot  TRIM BENIGN HYPERKERATOTIC SKIN LESION,>4    [facility]    Urea (CARMOL) 40 % cream   6. Pain in right foot  TRIM BENIGN HYPERKERATOTIC SKIN LESION,>4    [facility]    Urea (CARMOL) 40 % cream   7. Pain in toe of left foot  TRIM BENIGN HYPERKERATOTIC SKIN LESION,>4    Urea (CARMOL) 40 % cream   8. Pain in toe of right foot  TRIM BENIGN HYPERKERATOTIC SKIN LESION,>4    Urea (CARMOL) 40 % cream       Plan:  1. Clinical evaluation of the patient. 2. The lesion(s) to the bilateral foot debrided with a 15 blade down to healthy, pink skin without event. Patient given a prescription for Carmol 40 cream for treatment of these lesions. Patient to obtain and use a pumice stone to help keep down the thickness of his lesions. Patient given a referral to neurosurgery for evaluation of low back pain. 3. Contact office with any questions/problems/concerns. Return if symptoms worsen or fail to improve.    3/15/2021      Loretta Abdi DPM

## 2021-03-24 DIAGNOSIS — E78.2 MIXED HYPERLIPIDEMIA: ICD-10-CM

## 2021-03-25 RX ORDER — ATORVASTATIN CALCIUM 20 MG/1
TABLET, FILM COATED ORAL
Qty: 30 TABLET | Refills: 1 | Status: SHIPPED | OUTPATIENT
Start: 2021-03-25 | End: 2021-05-25 | Stop reason: SDUPTHER

## 2021-03-25 NOTE — TELEPHONE ENCOUNTER
Request for Lipitor . Pt due for f/u in May-- PC to pt-- Lm to contact office     Next Visit Date:  Future Appointments   Date Time Provider Fatou Escalona   5/28/2021  3:30 PM Juan Harvey, [facility] Maintenance   Topic Date Due    Hepatitis C screen  Never done    COVID-19 Vaccine (1) Never done    Lipid screen  03/08/2020    A1C test (Diabetic or Prediabetic)  10/01/2020    Shingles Vaccine (1 of 2) Never done    Colon cancer screen colonoscopy  03/01/2021    Flu vaccine (1) 06/30/2021 (Originally 9/1/2020)    Potassium monitoring  10/29/2021    Creatinine monitoring  10/29/2021    DTaP/Tdap/Td vaccine (3 - Td) 07/07/2030    Pneumococcal 0-64 years Vaccine  Completed    HIV screen  Completed    Hepatitis A vaccine  Aged Out    Hepatitis B vaccine  Aged Out    Hib vaccine  Aged Out    Meningococcal (ACWY) vaccine  Aged Out       Hemoglobin A1C (%)   Date Value   10/01/2019 5.7   03/08/2019 5.8   04/09/2018 5.5             ( goal A1C is < 7)   Microalb/Crt.  Ratio (mcg/mg creat)   Date Value   04/08/2016 6     LDL Cholesterol (mg/dL)   Date Value   03/08/2019 112       (goal LDL is <100)   AST (U/L)   Date Value   10/29/2020 15     ALT (U/L)   Date Value   10/29/2020 15     BUN (mg/dL)   Date Value   10/29/2020 12     BP Readings from Last 3 Encounters:   01/25/21 136/82   10/29/20 (!) 150/88   07/14/20 122/82          (goal 120/80)    All Future Testing planned in [facility]  Lab Frequency Next Occurrence   Hemoglobin A1C Once 04/20/2021   Lipid Panel Once 04/22/2021   Hepatitis C Antibody Once 04/20/2021   Urinalysis with Microscopic Once 04/15/2021   XR KNEE LEFT (3 VIEWS) Once 04/27/2021   MRI KNEE LEFT WO CONTRAST Once 05/31/2021         Patient Active Problem List:     Asthma     Back pain     Obesity     Hyperlipidemia     Hypertension     Chronic bilateral low back pain without sciatica     Lumbar disc disease     Marijuana use     Low folate

## 2021-04-29 ENCOUNTER — TELEPHONE (OUTPATIENT)
Dept: INTERNAL MEDICINE | Age: 51
End: 2021-04-29

## 2021-04-29 NOTE — LETTER
[address] [address]  Phone: [phone]  Fax: [phone]    Mike Greco MD        April 29, 2021    [place] [id number]      Dear Michael Perla: This letter is a reminder that you may have diagnostic testing that has not been completed. It is important to your well-being that these test(s) are performed. Please find the outstanding test(s) attached. If you could please have these completed before your next appointment. You can have the test completed at any [facility] facility or Lab. Please see the order for scheduling instructions. Any testing that needs completed other than blood work or xray's please call [phone] to schedule an appointment. Otherwise can be done at any [facility]. Please call our office at Dept: [phone] for additional information on the outstanding tests or let us know if they have been completed so we may update your chart. If you have any questions or concerns, please don't hesitate to call.     Sincerely,        Mike Greco MD

## 2021-05-25 ENCOUNTER — OFFICE VISIT (OUTPATIENT)
Dept: INTERNAL MEDICINE | Age: 51
End: 2021-05-25
Payer: COMMERCIAL

## 2021-05-25 VITALS
WEIGHT: 218 LBS | BODY MASS INDEX: 27.99 KG/M2 | SYSTOLIC BLOOD PRESSURE: 117 MMHG | DIASTOLIC BLOOD PRESSURE: 74 MMHG | HEART RATE: 69 BPM

## 2021-05-25 DIAGNOSIS — E53.8 LOW FOLATE: ICD-10-CM

## 2021-05-25 DIAGNOSIS — Z12.11 COLON CANCER SCREENING: ICD-10-CM

## 2021-05-25 DIAGNOSIS — J45.20 MILD INTERMITTENT ASTHMA WITHOUT COMPLICATION: ICD-10-CM

## 2021-05-25 DIAGNOSIS — F17.200 SMOKER: ICD-10-CM

## 2021-05-25 DIAGNOSIS — R73.02 IGT (IMPAIRED GLUCOSE TOLERANCE): ICD-10-CM

## 2021-05-25 DIAGNOSIS — E78.2 MIXED HYPERLIPIDEMIA: ICD-10-CM

## 2021-05-25 DIAGNOSIS — I10 ESSENTIAL HYPERTENSION: Primary | ICD-10-CM

## 2021-05-25 DIAGNOSIS — R20.8 BURNING SENSATION OF TOE AND FOOT: ICD-10-CM

## 2021-05-25 DIAGNOSIS — M72.2 PLANTAR FASCIITIS: ICD-10-CM

## 2021-05-25 LAB — HBA1C MFR BLD: 5.7 %

## 2021-05-25 PROCEDURE — 3017F COLORECTAL CA SCREEN DOC REV: CPT | Performed by: INTERNAL MEDICINE

## 2021-05-25 PROCEDURE — G8417 CALC BMI ABV UP PARAM F/U: HCPCS | Performed by: INTERNAL MEDICINE

## 2021-05-25 PROCEDURE — G8427 DOCREV CUR MEDS BY ELIG CLIN: HCPCS | Performed by: INTERNAL MEDICINE

## 2021-05-25 PROCEDURE — 4004F PT TOBACCO SCREEN RCVD TLK: CPT | Performed by: INTERNAL MEDICINE

## 2021-05-25 PROCEDURE — 99213 OFFICE O/P EST LOW 20 MIN: CPT | Performed by: INTERNAL MEDICINE

## 2021-05-25 PROCEDURE — 83036 HEMOGLOBIN GLYCOSYLATED A1C: CPT | Performed by: INTERNAL MEDICINE

## 2021-05-25 PROCEDURE — 99211 OFF/OP EST MAY X REQ PHY/QHP: CPT | Performed by: INTERNAL MEDICINE

## 2021-05-25 RX ORDER — BUTALBITAL, ACETAMINOPHEN AND CAFFEINE 50; 325; 40 MG/1; MG/1; MG/1
1 TABLET ORAL EVERY 6 HOURS PRN
Qty: 30 TABLET | Refills: 0 | Status: SHIPPED | OUTPATIENT
Start: 2021-05-25

## 2021-05-25 RX ORDER — LISINOPRIL 10 MG/1
10 TABLET ORAL DAILY
Qty: 30 TABLET | Refills: 5 | Status: SHIPPED | OUTPATIENT
Start: 2021-05-25

## 2021-05-25 RX ORDER — FOLIC ACID 1 MG/1
1 TABLET ORAL DAILY
Qty: 30 TABLET | Refills: 5 | Status: SHIPPED | OUTPATIENT
Start: 2021-05-25

## 2021-05-25 RX ORDER — ATORVASTATIN CALCIUM 20 MG/1
20 TABLET, FILM COATED ORAL DAILY
Qty: 30 TABLET | Refills: 5 | Status: SHIPPED | OUTPATIENT
Start: 2021-05-25

## 2021-05-25 RX ORDER — NAPROXEN 500 MG/1
500 TABLET ORAL 2 TIMES DAILY PRN
Qty: 60 TABLET | Refills: 5 | Status: SHIPPED | OUTPATIENT
Start: 2021-05-25

## 2021-05-25 RX ORDER — FAMOTIDINE 20 MG/1
20 TABLET, FILM COATED ORAL 2 TIMES DAILY
Qty: 60 TABLET | Refills: 5 | Status: SHIPPED | OUTPATIENT
Start: 2021-05-25

## 2021-05-25 SDOH — ECONOMIC STABILITY: FOOD INSECURITY: WITHIN THE PAST 12 MONTHS, THE FOOD YOU BOUGHT JUST DIDN'T LAST AND YOU DIDN'T HAVE MONEY TO GET MORE.: NEVER TRUE

## 2021-05-25 ASSESSMENT — ENCOUNTER SYMPTOMS
COUGH: 0
SHORTNESS OF BREATH: 0
PHOTOPHOBIA: 0
BACK PAIN: 1

## 2021-05-25 NOTE — PATIENT INSTRUCTIONS
-Pt due for 6 month f/u in November-- pt to call in [address] to set up an appt--reminder in [facility] to contact patient as well--AVS given to patient    -Bloodwork orders given to patient, they will have them done before their next visit.     -Your doctor has ordered a Lower Extremity Scan for you, please contact our scheduling department at [phone] to set up an appointment to have this completed before your next visit. Referral to Gasteroenterology dropped into work queue for [email or web address] , they will contact the patient to schedule. Phone number given to patient. [address] [facility] - [place] [id number] [address], [address]   [phone]    -B. Herb Chung

## 2021-05-25 NOTE — PROGRESS NOTES
[facility]    Progress Note    Date of patient's visit: 5/25/2021    Patient's Name:  Edison Wen    YOB: 1970            Patient Care Team:  Tatiana Matthews MD as PCP - Sherrill Vivar MD as PCP - [facility] Provider  Nenita Ortiz MD as Consulting Physician (Ophthalmology)    REASON FOR VISIT: Routine outpatient follow     Chief Complaint   Patient presents with   Jendelaney Reek Hypertension    Health Maintenance     shingrix declined, cologuard pended    Foot Pain     Pt seen podiatry on 3/15 states he is still having foot pain, needs to make follow up. HISTORY OF PRESENT ILLNESS:    History was obtained from the patient. Edison Wen is a 49 y.o. is here for. He continues to have some burning in his feet which is all the time. It is not worse with walking. He says it is worse when he is resting. He has seen podiatry several times for this. He was referred to neurosurgeon as it was thought it may be lumbar radiculopathy. He has not received an appointment yet from neurosurgery. He says he has some chronic back pain but he is able to walk every day without any discomfort and he is active with his activities of daily living. He had x-rays 2 or 3 years ago which were showing mild degenerative changes. He has no weakness. He used to go to pain management in [place] and was getting oxycodone in the past but has not been able to go for a few months. He had previously been found to have low folic acid and that has been replaced along with B12. EMG in 2019 showed possible tarsal tunnel syndrome but no radiculopathy. He is a smoker and smokes 1 to 2 packs of cigars daily. No other concerns. He is compliant with his medications. Blood pressure is controlled. A1c is stable. He agrees to a colonoscopy for cancer screening. No family history of colon cancer colon cancer but he thinks his mother had lung cancer.   Last time he was advised to get a repeat UA done. He had shown some hemoglobin in his urine on the ER visit. CT at this time at was negative of his abdomen. He did not follow-up with the labs that had been ordered last visit. I will have him repeat all his labs and come back in for follow-up. He has been following up with orthopedics for his knee pains. Results for POC orders placed in visit on 05/25/21   POCT glycosylated hemoglobin (Hb A1C)   Result Value Ref Range    Hemoglobin A1C 5.7 %         Past Medical History:   Diagnosis Date    Asthma     Chronic back pain     Headache(784.0)     HLD (hyperlipidemia) 11/6/2013    Hypertension 7/10/2014    Obesity 10/10/2013    Substance abuse ([facility] [address]     Type II or unspecified type diabetes mellitus without mention of complication, not stated as uncontrolled        No past surgical history on file.       ALLERGIES    No Known Allergies    MEDICATIONS:      Current Outpatient Medications on File Prior to Visit   Medication Sig Dispense Refill    atorvastatin (LIPITOR) 20 MG tablet TAKE 1 TABLET BY MOUTH DAILY 30 tablet 1    naproxen (EC NAPROSYN) 500 MG EC tablet Take 1 tablet by mouth 2 times daily as needed for Pain 60 tablet 0    lisinopril (PRINIVIL;ZESTRIL) 10 MG tablet TAKE 1 TABLET BY MOUTH DAILY 30 tablet 3    famotidine (PEPCID) 20 MG tablet Take 1 tablet by mouth 2 times daily 60 tablet 5    folic acid (FOLVITE) 1 MG tablet Take 1 tablet by mouth daily 90 tablet 0    butalbital-acetaminophen-caffeine (ESGIC) -40 MG per tablet Take 1 tablet by mouth every 6 hours as needed for Headaches 180 tablet 0    ASPIRIN LOW DOSE 81 MG EC tablet TAKE 1 TABLET BY MOUTH DAILY 30 tablet 11    ibuprofen (ADVIL;MOTRIN) 800 MG tablet Take 800 mg by mouth daily as needed for Pain      VENTOLIN  (90 BASE) MCG/ACT inhaler INHALE 2 PUFFS EVERY SIX HOURS AS NEEDED FOR WHEEZING 18 g 11    Urea (CARMOL) 40 % cream Apply to affected areas of both feet twice daily. (Patient not taking: Reported on 5/25/2021) 198.4 g 3    vitamin B-12 (CYANOCOBALAMIN) 1000 MCG tablet Take 1 tablet by mouth daily 30 tablet 5     No current facility-administered medications on file prior to visit. HISTORY    Reviewed and no change from previous record. Vicky Mcdermott  reports that he has been smoking cigars. He has a 10.00 pack-year smoking history. He has never used smokeless tobacco.    FAMILY HISTORY:    Reviewed and No change from previous visit    HEALTH MAINTENANCE DUE:      Health Maintenance Due   Topic Date Due    Hepatitis C screen  Never done    COVID-19 Vaccine (1) Never done    Lipid screen  03/08/2020    Shingles Vaccine (1 of 2) Never done    Colon cancer screen colonoscopy  Never done       REVIEW OF SYSTEMS:    12 point review of symptoms completed and found to be normal except noted in the HPI    Review of Systems   Constitutional: Negative for fatigue and unexpected weight change. Eyes: Negative for photophobia and visual disturbance. Respiratory: Negative for cough and shortness of breath. Cardiovascular: Negative for chest pain, palpitations and leg swelling. Genitourinary: Negative for dysuria and hematuria. Musculoskeletal: Positive for arthralgias and back pain. Neurological: Negative for dizziness, weakness and headaches. Burning feet   Hematological: Negative for adenopathy. Does not bruise/bleed easily. PHYSICAL EXAM:     Vitals:    05/25/21 1523   BP: 117/74   Site: Right Upper Arm   Position: Sitting   Cuff Size: Large Adult   Pulse: 69   Weight: 218 lb (98.9 kg)     Body mass index is 27.99 kg/m². BP Readings from Last 3 Encounters:   05/25/21 117/74   01/25/21 136/82   10/29/20 (!) 150/88        Wt Readings from Last 3 Encounters:   05/25/21 218 lb (98.9 kg)   03/15/21 215 lb (97.5 kg)   02/26/21 220 lb (99.8 kg)       Physical Exam  Vitals and nursing note reviewed.    Constitutional:       Appearance: Normal appearance. Eyes:      Extraocular Movements: Extraocular movements intact. Conjunctiva/sclera: Conjunctivae normal.      Pupils: Pupils are equal, round, and reactive to light. Cardiovascular:      Rate and Rhythm: Normal rate and regular rhythm. Pulmonary:      Effort: Pulmonary effort is normal.      Breath sounds: Normal breath sounds. Musculoskeletal:      Cervical back: Normal range of motion. Right lower leg: No edema. Left lower leg: No edema. Neurological:      General: No focal deficit present. Mental Status: He is alert and oriented to person, place, and time. Motor: No weakness. LABORATORY FINDINGS:    CBC:  Lab Results   Component Value Date    WBC 6.5 10/29/2020    HGB 15.0 10/29/2020     10/29/2020     BMP:    Lab Results   Component Value Date     10/29/2020    K 4.0 10/29/2020     10/29/2020    CO2 25 10/29/2020    BUN 12 10/29/2020    CREATININE 0.79 10/29/2020    GLUCOSE 92 10/29/2020     HEMOGLOBIN A1C:   Lab Results   Component Value Date    LABA1C 5.7 05/25/2021     MICROALBUMIN URINE:   Lab Results   Component Value Date    MICROALBUR <12 04/08/2016     FASTING LIPID PANEL:  Lab Results   Component Value Date    CHOL 163 03/08/2019    HDL 34 (L) 03/08/2019    TRIG 86 03/08/2019     Lab Results   Component Value Date    LDLCHOLESTEROL 112 03/08/2019       LIVER PROFILE:  Lab Results   Component Value Date    ALT 15 10/29/2020    AST 15 10/29/2020    PROT 7.1 10/29/2020    BILITOT 0.46 10/29/2020    BILIDIR <0.08 07/02/2015    LABALBU 4.2 10/29/2020      THYROID FUNCTION:   Lab Results   Component Value Date    TSH 2.44 10/01/2019      URINEANALYSIS: No results found for: LABURIN  ASSESSMENT AND PLAN:    1. Essential hypertension    - lisinopril (PRINIVIL;ZESTRIL) 10 MG tablet; Take 1 tablet by mouth daily  Dispense: 30 tablet; Refill: 5  - Comprehensive Metabolic Panel; Future    2.  IGT (impaired glucose tolerance)    - Comprehensive Metabolic Panel; Future    3. Mixed hyperlipidemia    - atorvastatin (LIPITOR) 20 MG tablet; Take 1 tablet by mouth daily  Dispense: 30 tablet; Refill: 5  - Lipid Panel; Future  - Comprehensive Metabolic Panel; Future    4. Plantar fasciitis    - naproxen (EC NAPROSYN) 500 MG EC tablet; Take 1 tablet by mouth 2 times daily as needed for Pain  Dispense: 60 tablet; Refill: 5    5. Low folate    - folic acid (FOLVITE) 1 MG tablet; Take 1 tablet by mouth daily  Dispense: 30 tablet; Refill: 5    6. Mild intermittent asthma without complication      7. Colon cancer screening    - Valentin Calhoun MD, Gastroenterology, [facility]    8. Smoker    - VL LOWER EXTREMITY ARTERIAL SEGMENTAL PRESSURES W [facility]; Future    9. Burning sensation of toe and foot  Possibly lumbar radiculopathy. Follow-up with neurosurgeon as recommended by podiatry. - VL LOWER EXTREMITY ARTERIAL SEGMENTAL PRESSURES W [facility]; Future          FOLLOW UP AND INSTRUCTIONS:   Return in about 6 months (around 11/25/2021). 1. [employer] received counseling on the following healthy behaviors: nutrition, exercise and medication adherence    2. Reviewed prior labs and health maintenance. 3. Discussed use, benefit, and side effects of prescribed medications. Barriers to medication compliance addressed. All patient questions answered. Pt voiced understanding.        Naman Forbes  Attending Physician, [address] [place] [address], Internal Medicine Residency Program  [address] [place] [address]  5/25/2021, 3:43 PM

## 2021-05-28 ENCOUNTER — TELEPHONE (OUTPATIENT)
Dept: GASTROENTEROLOGY | Age: 51
End: 2021-05-28

## 2021-06-10 NOTE — TELEPHONE ENCOUNTER
Pt called to schedule from referral. Based on colon screen questionnaire, pt will not need OV first. Pt informed that our office will have to call him tomorrow to schedule since Dr Brisa Swift is at a satellite office today. Pt voices understanding. He will need a covid test and prefers [facility].

## 2021-06-14 ENCOUNTER — HOSPITAL ENCOUNTER (OUTPATIENT)
Age: 51
Setting detail: SPECIMEN
Discharge: HOME | End: 2021-06-14
Payer: COMMERCIAL

## 2021-06-14 DIAGNOSIS — I10 ESSENTIAL HYPERTENSION: ICD-10-CM

## 2021-06-14 DIAGNOSIS — E78.2 MIXED HYPERLIPIDEMIA: ICD-10-CM

## 2021-06-14 DIAGNOSIS — R73.02 IGT (IMPAIRED GLUCOSE TOLERANCE): ICD-10-CM

## 2021-06-14 LAB
ALBUMIN SERPL-MCNC: 4.2 G/DL (ref 3.5–5.2)
ALBUMIN/GLOBULIN RATIO: 1.4 (ref 1–2.5)
ALP BLD-CCNC: 68 U/L (ref 40–129)
ALT SERPL-CCNC: 13 U/L (ref 5–41)
ANION GAP SERPL CALCULATED.3IONS-SCNC: 11 MMOL/L (ref 9–17)
AST SERPL-CCNC: 19 U/L
BILIRUB SERPL-MCNC: 0.36 MG/DL (ref 0.3–1.2)
BUN BLDV-MCNC: 17 MG/DL (ref 6–20)
BUN/CREAT BLD: NORMAL (ref 9–20)
CALCIUM SERPL-MCNC: 9.1 MG/DL (ref 8.6–10.4)
CHLORIDE BLD-SCNC: 106 MMOL/L (ref 98–107)
CHOLESTEROL/HDL RATIO: 4.2
CHOLESTEROL: 154 MG/DL
CO2: 24 MMOL/L (ref 20–31)
CREAT SERPL-MCNC: 0.86 MG/DL (ref 0.7–1.2)
GFR AFRICAN AMERICAN: >60 ML/MIN
GFR NON-AFRICAN AMERICAN: >60 ML/MIN
GFR SERPL CREATININE-BSD FRML MDRD: NORMAL ML/MIN/{1.73_M2}
GFR SERPL CREATININE-BSD FRML MDRD: NORMAL ML/MIN/{1.73_M2}
GLUCOSE BLD-MCNC: 91 MG/DL (ref 70–99)
HDLC SERPL-MCNC: 37 MG/DL
LDL CHOLESTEROL: 104 MG/DL (ref 0–130)
POTASSIUM SERPL-SCNC: 4.4 MMOL/L (ref 3.7–5.3)
SODIUM BLD-SCNC: 141 MMOL/L (ref 135–144)
TOTAL PROTEIN: 7.2 G/DL (ref 6.4–8.3)
TRIGL SERPL-MCNC: 67 MG/DL
VLDLC SERPL CALC-MCNC: ABNORMAL MG/DL (ref 1–30)

## 2021-07-02 NOTE — TELEPHONE ENCOUNTER
Patient on referral wq- called patient to schedule colon screen procedure. No answer lvm to call us back to schedule procedure with Dr Luanne Matthews. Tunde Patel. No office visit requiered per questionnaire and prefers [facility]'s.

## 2021-11-15 ENCOUNTER — TELEPHONE (OUTPATIENT)
Dept: INTERNAL MEDICINE | Age: 51
End: 2021-11-15

## 2021-11-15 NOTE — LETTER
[address] [place] [address] [address]  Phone: [phone]  Fax: [phone]    Duane Ibarra MD        November 15, 2021    [place] [id number]      Dear Earnest Sagastume: This letter is a reminder that you may have diagnostic testing that has not been completed. It is important to your well-being that these test(s) are performed. Please find the outstanding test(s) attached. If you could please have these completed before your next appointment. You can have the test completed at any [facility] facility or Lab. Please see the order for scheduling instructions. Any testing that needs completed other than blood work or xray's please call [phone] to schedule an appointment. Otherwise can be done at any [facility]. Please call our office at Dept: [phone] for additional information on the outstanding tests or let us know if they have been completed so we may update your chart. If you have any questions or concerns, please don't hesitate to call.     Sincerely,        Duane Ibarra MD